# Patient Record
Sex: MALE | Race: WHITE | NOT HISPANIC OR LATINO | Employment: FULL TIME | URBAN - METROPOLITAN AREA
[De-identification: names, ages, dates, MRNs, and addresses within clinical notes are randomized per-mention and may not be internally consistent; named-entity substitution may affect disease eponyms.]

---

## 2017-02-08 ENCOUNTER — HOSPITAL ENCOUNTER (OUTPATIENT)
Dept: GENERAL RADIOLOGY | Facility: HOSPITAL | Age: 62
Discharge: HOME | End: 2017-02-08
Admitting: PHYSICIAN ASSISTANT

## 2017-02-08 ENCOUNTER — TRANSCRIBE ORDERS (OUTPATIENT)
Dept: ADMINISTRATIVE | Facility: HOSPITAL | Age: 62
End: 2017-02-08

## 2017-02-08 DIAGNOSIS — M25.562 LEFT KNEE PAIN, UNSPECIFIED CHRONICITY: Primary | ICD-10-CM

## 2017-02-08 DIAGNOSIS — M25.562 LEFT KNEE PAIN, UNSPECIFIED CHRONICITY: ICD-10-CM

## 2017-02-08 PROCEDURE — 73560 X-RAY EXAM OF KNEE 1 OR 2: CPT

## 2018-01-19 ENCOUNTER — HOSPITAL ENCOUNTER (OUTPATIENT)
Facility: HOSPITAL | Age: 63
Setting detail: HOSPITAL OUTPATIENT SURGERY
End: 2018-01-19
Attending: SURGERY | Admitting: SURGERY

## 2018-03-20 ENCOUNTER — ANESTHESIA EVENT (OUTPATIENT)
Dept: GASTROENTEROLOGY | Facility: HOSPITAL | Age: 63
End: 2018-03-20

## 2018-03-20 ENCOUNTER — HOSPITAL ENCOUNTER (OUTPATIENT)
Facility: HOSPITAL | Age: 63
Setting detail: HOSPITAL OUTPATIENT SURGERY
Discharge: HOME | End: 2018-03-20
Attending: SURGERY | Admitting: SURGERY

## 2018-03-20 ENCOUNTER — ANESTHESIA (OUTPATIENT)
Dept: GASTROENTEROLOGY | Facility: HOSPITAL | Age: 63
End: 2018-03-20

## 2018-03-20 VITALS
BODY MASS INDEX: 39.34 KG/M2 | OXYGEN SATURATION: 98 % | DIASTOLIC BLOOD PRESSURE: 72 MMHG | HEIGHT: 71 IN | HEART RATE: 67 BPM | SYSTOLIC BLOOD PRESSURE: 134 MMHG | RESPIRATION RATE: 18 BRPM | TEMPERATURE: 98.1 F | WEIGHT: 281 LBS

## 2018-03-20 LAB — GLUCOSE BLDC GLUCOMTR-MCNC: 96 MG/DL (ref 70–130)

## 2018-03-20 PROCEDURE — 82962 GLUCOSE BLOOD TEST: CPT

## 2018-03-20 PROCEDURE — 25010000002 PROPOFOL 10 MG/ML EMULSION: Performed by: ANESTHESIOLOGY

## 2018-03-20 RX ORDER — FUROSEMIDE 40 MG/1
40 TABLET ORAL DAILY
COMMUNITY
End: 2020-04-22 | Stop reason: ALTCHOICE

## 2018-03-20 RX ORDER — LIDOCAINE HYDROCHLORIDE 10 MG/ML
0.5 INJECTION, SOLUTION INFILTRATION; PERINEURAL ONCE AS NEEDED
Status: DISCONTINUED | OUTPATIENT
Start: 2018-03-20 | End: 2018-03-20 | Stop reason: HOSPADM

## 2018-03-20 RX ORDER — PROPOFOL 10 MG/ML
VIAL (ML) INTRAVENOUS AS NEEDED
Status: DISCONTINUED | OUTPATIENT
Start: 2018-03-20 | End: 2018-03-20 | Stop reason: SURG

## 2018-03-20 RX ORDER — LIDOCAINE HYDROCHLORIDE 20 MG/ML
INJECTION, SOLUTION INFILTRATION; PERINEURAL AS NEEDED
Status: DISCONTINUED | OUTPATIENT
Start: 2018-03-20 | End: 2018-03-20 | Stop reason: SURG

## 2018-03-20 RX ORDER — SODIUM CHLORIDE, SODIUM LACTATE, POTASSIUM CHLORIDE, CALCIUM CHLORIDE 600; 310; 30; 20 MG/100ML; MG/100ML; MG/100ML; MG/100ML
1000 INJECTION, SOLUTION INTRAVENOUS CONTINUOUS PRN
Status: DISCONTINUED | OUTPATIENT
Start: 2018-03-20 | End: 2018-03-20 | Stop reason: HOSPADM

## 2018-03-20 RX ORDER — SODIUM CHLORIDE 0.9 % (FLUSH) 0.9 %
3 SYRINGE (ML) INJECTION AS NEEDED
Status: DISCONTINUED | OUTPATIENT
Start: 2018-03-20 | End: 2018-03-20 | Stop reason: HOSPADM

## 2018-03-20 RX ORDER — PROPOFOL 10 MG/ML
VIAL (ML) INTRAVENOUS CONTINUOUS PRN
Status: DISCONTINUED | OUTPATIENT
Start: 2018-03-20 | End: 2018-03-20 | Stop reason: SURG

## 2018-03-20 RX ORDER — LISINOPRIL 40 MG/1
40 TABLET ORAL DAILY
COMMUNITY
End: 2019-04-22 | Stop reason: ALTCHOICE

## 2018-03-20 RX ORDER — DILTIAZEM HYDROCHLORIDE 120 MG/1
120 TABLET, FILM COATED ORAL 3 TIMES DAILY
COMMUNITY
End: 2019-04-22

## 2018-03-20 RX ADMIN — SODIUM CHLORIDE, POTASSIUM CHLORIDE, SODIUM LACTATE AND CALCIUM CHLORIDE 1000 ML: 600; 310; 30; 20 INJECTION, SOLUTION INTRAVENOUS at 10:24

## 2018-03-20 RX ADMIN — SODIUM CHLORIDE, POTASSIUM CHLORIDE, SODIUM LACTATE AND CALCIUM CHLORIDE: 600; 310; 30; 20 INJECTION, SOLUTION INTRAVENOUS at 10:37

## 2018-03-20 RX ADMIN — PROPOFOL 150 MG: 10 INJECTION, EMULSION INTRAVENOUS at 10:44

## 2018-03-20 RX ADMIN — PROPOFOL 200 MCG/KG/MIN: 10 INJECTION, EMULSION INTRAVENOUS at 10:44

## 2018-03-20 RX ADMIN — LIDOCAINE HYDROCHLORIDE 30 MG: 20 INJECTION, SOLUTION INFILTRATION; PERINEURAL at 10:44

## 2018-03-20 NOTE — DISCHARGE INSTRUCTIONS
Colonoscopy, Adult, Care After  This sheet gives you information about how to care for yourself after your procedure. Your health care provider may also give you more specific instructions. If you have problems or questions, contact your health care provider.  What can I expect after the procedure?  After the procedure, it is common to have:  · A small amount of blood in your stool for 24 hours after the procedure.  · Some gas.  · Mild abdominal cramping or bloating.  Follow these instructions at home:  General instructions     · For the first 24 hours after the procedure:  ¨ Do not drive or use machinery.  ¨ Do not sign important documents.  ¨ Do not drink alcohol.  ¨ Do your regular daily activities at a slower pace than normal.  ¨ Eat soft, easy-to-digest foods.  ¨ Rest often.  · Take over-the-counter or prescription medicines only as told by your health care provider.  · It is up to you to get the results of your procedure. Ask your health care provider, or the department performing the procedure, when your results will be ready.  Relieving cramping and bloating   · Try walking around when you have cramps or feel bloated.  · Apply heat to your abdomen as told by your health care provider. Use a heat source that your health care provider recommends, such as a moist heat pack or a heating pad.  ¨ Place a towel between your skin and the heat source.  ¨ Leave the heat on for 20-30 minutes.  ¨ Remove the heat if your skin turns bright red. This is especially important if you are unable to feel pain, heat, or cold. You may have a greater risk of getting burned.  Eating and drinking   · Drink enough fluid to keep your urine clear or pale yellow.  · Resume your normal diet as instructed by your health care provider. Avoid heavy or fried foods that are hard to digest.  · Avoid drinking alcohol for as long as instructed by your health care provider.  Contact a health care provider if:  · You have blood in your stool 2-3  days after the procedure.  Get help right away if:  · You have more than a small spotting of blood in your stool.  · You pass large blood clots in your stool.  · Your abdomen is swollen.  · You have nausea or vomiting.  · You have a fever.  · You have increasing abdominal pain that is not relieved with medicine.  This information is not intended to replace advice given to you by your health care provider. Make sure you discuss any questions you have with your health care provider.  Document Released: 08/01/2005 Document Revised: 09/11/2017 Document Reviewed: 02/28/2017  Elsevier Interactive Patient Education © 2017 [employer]

## 2018-03-20 NOTE — ANESTHESIA PREPROCEDURE EVALUATION
Anesthesia Evaluation     Patient summary reviewed and Nursing notes reviewed                Airway   Mallampati: III  TM distance: <3 FB  Neck ROM: full  Possible difficult intubation  Dental - normal exam     Pulmonary - normal exam   Cardiovascular     Rhythm: regular  Rate: abnormal    (+) hypertension, PVD,     PE comment: SB/rate 52    Neuro/Psych  GI/Hepatic/Renal/Endo    (+) obesity, morbid obesity,  diabetes mellitus type 2,     Musculoskeletal     Abdominal  - normal exam   Substance History      OB/GYN          Other                      Anesthesia Plan    ASA 3     MAC     intravenous induction   Anesthetic plan and risks discussed with patient.

## 2018-03-20 NOTE — OP NOTE
Colonoscopy Procedure Note    Pre-operative Diagnosis: screening for colon cancer    Post-operative Diagnosis: normal,     Procedure: Colonoscopy --screening    Surgeon:  Marko Davis M.D,  F.A.C.S.    Sedation: MAC    Procedure Details:  Informed consent was obtained for the procedure, including sedation.  Risks of perforation, hemorrhage, were discussed.The patient was monitored continuously with ECG tracing, pulse oximetry, blood pressure monitoring, and direct observations. The patient was placed in the left lateral decubitus position. The patient was given intravenous sedation by anesthesia.     A digital rectal examination was performed.  The variable-stiffness pediatric colonoscope was inserted into the rectum and advanced under direct vision to the cecum, which was identified by the ileocecal valve and appendiceal orifice.  The quality of the colonic preparation was good. A retroflexed view of the right colon was normal.     Careful inspection was made as the colonoscope was withdrawn, including a retroflexed view of the rectum.  No abnormalities were seen anywhere in the colon.  No diverticulosis was seen.  No inflammatory changes or polyps were seen.  Digital exam of the rectum was performed which revealed no abnormalities    Colonoscopy findings:  -normal colonic mucosa throughout  -no polyps    Specimens: none           Complications:  none    Recommendations:  -For colon cancer screening in this patient, a repeat colonoscopy is recommended in   10 years.        Signature: Marko Davis MD

## 2018-03-20 NOTE — ANESTHESIA POSTPROCEDURE EVALUATION
Patient: Alexi Leyva    Procedure Summary     Date:  03/20/18 Room / Location:  [facility] ENDOSCOPY 7 /  [facility]    Anesthesia Start:  1037 Anesthesia Stop:  1109    Procedure:  COLONOSCOPY TO CECUM (N/A ) Diagnosis:      Surgeon:  Marko Davis MD Provider:  Onesimo Poe MD    Anesthesia Type:  MAC ASA Status:  3          Anesthesia Type: MAC  Last vitals  BP   172/60 (03/20/18 1014)   Temp   36.6 °C (97.9 °F) (03/20/18 1014)   Pulse   52 (03/20/18 1014)   Resp   16 (03/20/18 1014)     SpO2   98 % (03/20/18 1014)     Post Anesthesia Care and Evaluation    Patient location during evaluation: PHASE II  Patient participation: complete - patient participated  Level of consciousness: awake and alert  Pain management: adequate  Airway patency: patent  Anesthetic complications: No anesthetic complications  PONV Status: none  Cardiovascular status: acceptable  Respiratory status: acceptable  Hydration status: acceptable

## 2019-04-22 ENCOUNTER — OFFICE VISIT (OUTPATIENT)
Dept: CARDIOLOGY | Facility: CLINIC | Age: 64
End: 2019-04-22

## 2019-04-22 VITALS
DIASTOLIC BLOOD PRESSURE: 69 MMHG | HEART RATE: 47 BPM | SYSTOLIC BLOOD PRESSURE: 194 MMHG | HEIGHT: 71 IN | WEIGHT: 299 LBS | BODY MASS INDEX: 41.86 KG/M2

## 2019-04-22 DIAGNOSIS — I10 ESSENTIAL HYPERTENSION: Primary | ICD-10-CM

## 2019-04-22 PROCEDURE — 93000 ELECTROCARDIOGRAM COMPLETE: CPT | Performed by: INTERNAL MEDICINE

## 2019-04-22 PROCEDURE — 99203 OFFICE O/P NEW LOW 30 MIN: CPT | Performed by: INTERNAL MEDICINE

## 2019-04-22 RX ORDER — METOPROLOL SUCCINATE 100 MG/1
100 TABLET, EXTENDED RELEASE ORAL DAILY
COMMUNITY
End: 2019-04-22 | Stop reason: ALTCHOICE

## 2019-04-22 RX ORDER — HYDRALAZINE HYDROCHLORIDE 25 MG/1
25 TABLET, FILM COATED ORAL 3 TIMES DAILY
COMMUNITY
End: 2020-01-31

## 2019-04-22 RX ORDER — AMLODIPINE BESYLATE 10 MG/1
10 TABLET ORAL DAILY
COMMUNITY

## 2019-04-22 RX ORDER — LABETALOL 200 MG/1
200 TABLET, FILM COATED ORAL 2 TIMES DAILY
Qty: 60 TABLET | Refills: 3 | Status: SHIPPED | OUTPATIENT
Start: 2019-04-22 | End: 2019-09-16 | Stop reason: SDUPTHER

## 2019-04-22 RX ORDER — HYDROCHLOROTHIAZIDE 25 MG/1
25 TABLET ORAL DAILY
COMMUNITY
End: 2020-01-31

## 2019-04-22 RX ORDER — TELMISARTAN 40 MG/1
40 TABLET ORAL DAILY
Qty: 30 TABLET | Refills: 6 | Status: SHIPPED | OUTPATIENT
Start: 2019-04-22 | End: 2020-01-31

## 2019-04-22 RX ORDER — CLONIDINE HYDROCHLORIDE 0.3 MG/1
0.3 TABLET ORAL 2 TIMES DAILY
COMMUNITY
End: 2019-07-01

## 2019-05-01 PROBLEM — I10 ESSENTIAL HYPERTENSION: Status: ACTIVE | Noted: 2019-05-01

## 2019-05-06 ENCOUNTER — HOSPITAL ENCOUNTER (OUTPATIENT)
Dept: CARDIOLOGY | Facility: HOSPITAL | Age: 64
Discharge: HOME | End: 2019-05-06
Admitting: INTERNAL MEDICINE

## 2019-05-06 VITALS
WEIGHT: 299 LBS | HEIGHT: 71 IN | HEART RATE: 53 BPM | DIASTOLIC BLOOD PRESSURE: 77 MMHG | SYSTOLIC BLOOD PRESSURE: 168 MMHG | BODY MASS INDEX: 41.86 KG/M2

## 2019-05-06 LAB
BH CV ECHO MEAS - ACS: 2.5 CM
BH CV ECHO MEAS - AO MAX PG (FULL): 4.9 MMHG
BH CV ECHO MEAS - AO MAX PG: 8.1 MMHG
BH CV ECHO MEAS - AO MEAN PG (FULL): 2 MMHG
BH CV ECHO MEAS - AO MEAN PG: 4 MMHG
BH CV ECHO MEAS - AO ROOT AREA (BSA CORRECTED): 1.4
BH CV ECHO MEAS - AO ROOT AREA: 10.2 CM^2
BH CV ECHO MEAS - AO ROOT DIAM: 3.6 CM
BH CV ECHO MEAS - AO V2 MAX: 142 CM/SEC
BH CV ECHO MEAS - AO V2 MEAN: 93.4 CM/SEC
BH CV ECHO MEAS - AO V2 VTI: 32.5 CM
BH CV ECHO MEAS - ASC AORTA: 4.2 CM
BH CV ECHO MEAS - AVA(I,A): 3.5 CM^2
BH CV ECHO MEAS - AVA(I,D): 3.5 CM^2
BH CV ECHO MEAS - AVA(V,A): 3.3 CM^2
BH CV ECHO MEAS - AVA(V,D): 3.3 CM^2
BH CV ECHO MEAS - BSA(HAYCOCK): 2.7 M^2
BH CV ECHO MEAS - BSA: 2.5 M^2
BH CV ECHO MEAS - BZI_BMI: 41.7 KILOGRAMS/M^2
BH CV ECHO MEAS - BZI_METRIC_HEIGHT: 180.3 CM
BH CV ECHO MEAS - BZI_METRIC_WEIGHT: 135.6 KG
BH CV ECHO MEAS - EDV(CUBED): 148.9 ML
BH CV ECHO MEAS - EDV(MOD-SP2): 138 ML
BH CV ECHO MEAS - EDV(MOD-SP4): 174 ML
BH CV ECHO MEAS - EDV(TEICH): 135.3 ML
BH CV ECHO MEAS - EF(CUBED): 73.6 %
BH CV ECHO MEAS - EF(MOD-BP): 66 %
BH CV ECHO MEAS - EF(MOD-SP2): 66.7 %
BH CV ECHO MEAS - EF(MOD-SP4): 64.4 %
BH CV ECHO MEAS - EF(TEICH): 65 %
BH CV ECHO MEAS - ESV(CUBED): 39.3 ML
BH CV ECHO MEAS - ESV(MOD-SP2): 46 ML
BH CV ECHO MEAS - ESV(MOD-SP4): 62 ML
BH CV ECHO MEAS - ESV(TEICH): 47.4 ML
BH CV ECHO MEAS - FS: 35.8 %
BH CV ECHO MEAS - IVS/LVPW: 0.94
BH CV ECHO MEAS - IVSD: 1.6 CM
BH CV ECHO MEAS - LAT PEAK E' VEL: 9 CM/SEC
BH CV ECHO MEAS - LV DIASTOLIC VOL/BSA (35-75): 69.5 ML/M^2
BH CV ECHO MEAS - LV MASS(C)D: 405.9 GRAMS
BH CV ECHO MEAS - LV MASS(C)DI: 162.2 GRAMS/M^2
BH CV ECHO MEAS - LV MAX PG: 3.2 MMHG
BH CV ECHO MEAS - LV MEAN PG: 2 MMHG
BH CV ECHO MEAS - LV SYSTOLIC VOL/BSA (12-30): 24.8 ML/M^2
BH CV ECHO MEAS - LV V1 MAX: 89.4 CM/SEC
BH CV ECHO MEAS - LV V1 MEAN: 62.2 CM/SEC
BH CV ECHO MEAS - LV V1 VTI: 21.4 CM
BH CV ECHO MEAS - LVIDD: 5.3 CM
BH CV ECHO MEAS - LVIDS: 3.4 CM
BH CV ECHO MEAS - LVLD AP2: 8.6 CM
BH CV ECHO MEAS - LVLD AP4: 8.2 CM
BH CV ECHO MEAS - LVLS AP2: 6.7 CM
BH CV ECHO MEAS - LVLS AP4: 6.3 CM
BH CV ECHO MEAS - LVOT AREA (M): 5.3 CM^2
BH CV ECHO MEAS - LVOT AREA: 5.3 CM^2
BH CV ECHO MEAS - LVOT DIAM: 2.6 CM
BH CV ECHO MEAS - LVPWD: 1.7 CM
BH CV ECHO MEAS - MED PEAK E' VEL: 13.5 CM/SEC
BH CV ECHO MEAS - MV A DUR: 0.2 SEC
BH CV ECHO MEAS - MV A MAX VEL: 89.2 CM/SEC
BH CV ECHO MEAS - MV DEC SLOPE: 319 CM/SEC^2
BH CV ECHO MEAS - MV DEC TIME: 0.16 SEC
BH CV ECHO MEAS - MV E MAX VEL: 106 CM/SEC
BH CV ECHO MEAS - MV E/A: 1.2
BH CV ECHO MEAS - MV MAX PG: 3.2 MMHG
BH CV ECHO MEAS - MV MEAN PG: 1 MMHG
BH CV ECHO MEAS - MV P1/2T MAX VEL: 86.9 CM/SEC
BH CV ECHO MEAS - MV P1/2T: 79.8 MSEC
BH CV ECHO MEAS - MV V2 MAX: 88.9 CM/SEC
BH CV ECHO MEAS - MV V2 MEAN: 50.5 CM/SEC
BH CV ECHO MEAS - MV V2 VTI: 29.2 CM
BH CV ECHO MEAS - MVA P1/2T LCG: 2.5 CM^2
BH CV ECHO MEAS - MVA(P1/2T): 2.8 CM^2
BH CV ECHO MEAS - MVA(VTI): 3.9 CM^2
BH CV ECHO MEAS - PA ACC TIME: 0.11 SEC
BH CV ECHO MEAS - PA MAX PG (FULL): 0.52 MMHG
BH CV ECHO MEAS - PA MAX PG: 3.4 MMHG
BH CV ECHO MEAS - PA PR(ACCEL): 27.7 MMHG
BH CV ECHO MEAS - PA V2 MAX: 92.3 CM/SEC
BH CV ECHO MEAS - PULM A REVS DUR: 0.22 SEC
BH CV ECHO MEAS - PULM A REVS VEL: 30.4 CM/SEC
BH CV ECHO MEAS - PULM DIAS VEL: 45 CM/SEC
BH CV ECHO MEAS - PULM S/D: 1.4
BH CV ECHO MEAS - PULM SYS VEL: 61.7 CM/SEC
BH CV ECHO MEAS - PVA(V,A): 3.2 CM^2
BH CV ECHO MEAS - PVA(V,D): 3.2 CM^2
BH CV ECHO MEAS - QP/QS: 0.66
BH CV ECHO MEAS - RAP SYSTOLE: 3 MMHG
BH CV ECHO MEAS - RV MAX PG: 2.9 MMHG
BH CV ECHO MEAS - RV MEAN PG: 2 MMHG
BH CV ECHO MEAS - RV V1 MAX: 85 CM/SEC
BH CV ECHO MEAS - RV V1 MEAN: 63.6 CM/SEC
BH CV ECHO MEAS - RV V1 VTI: 21.7 CM
BH CV ECHO MEAS - RVOT AREA: 3.5 CM^2
BH CV ECHO MEAS - RVOT DIAM: 2.1 CM
BH CV ECHO MEAS - RVSP: 14.3 MMHG
BH CV ECHO MEAS - SI(AO): 132.2 ML/M^2
BH CV ECHO MEAS - SI(CUBED): 43.8 ML/M^2
BH CV ECHO MEAS - SI(LVOT): 45.4 ML/M^2
BH CV ECHO MEAS - SI(MOD-SP2): 36.8 ML/M^2
BH CV ECHO MEAS - SI(MOD-SP4): 44.8 ML/M^2
BH CV ECHO MEAS - SI(TEICH): 35.1 ML/M^2
BH CV ECHO MEAS - SV(AO): 330.8 ML
BH CV ECHO MEAS - SV(CUBED): 109.6 ML
BH CV ECHO MEAS - SV(LVOT): 113.6 ML
BH CV ECHO MEAS - SV(MOD-SP2): 92 ML
BH CV ECHO MEAS - SV(MOD-SP4): 112 ML
BH CV ECHO MEAS - SV(RVOT): 75.2 ML
BH CV ECHO MEAS - SV(TEICH): 87.9 ML
BH CV ECHO MEAS - TAPSE (>1.6): 2.8 CM2
BH CV ECHO MEAS - TR MAX VEL: 168 CM/SEC
BH CV ECHO MEASUREMENTS AVERAGE E/E' RATIO: 9.42
BH CV XLRA - RV BASE: 3.1 CM
BH CV XLRA - RV LENGTH: 8.1 CM
BH CV XLRA - RV MID: 2.6 CM
BH CV XLRA - TDI S': 14.3 CM/SEC
LEFT ATRIUM VOLUME INDEX: 43 ML/M2
MAXIMAL PREDICTED HEART RATE: 158 BPM
STRESS TARGET HR: 134 BPM

## 2019-05-06 PROCEDURE — 93306 TTE W/DOPPLER COMPLETE: CPT

## 2019-05-06 PROCEDURE — 93306 TTE W/DOPPLER COMPLETE: CPT | Performed by: INTERNAL MEDICINE

## 2019-05-23 ENCOUNTER — OFFICE VISIT (OUTPATIENT)
Dept: CARDIOLOGY | Facility: CLINIC | Age: 64
End: 2019-05-23

## 2019-05-23 VITALS
SYSTOLIC BLOOD PRESSURE: 157 MMHG | HEIGHT: 71 IN | BODY MASS INDEX: 41.86 KG/M2 | HEART RATE: 46 BPM | DIASTOLIC BLOOD PRESSURE: 68 MMHG | WEIGHT: 299 LBS

## 2019-05-23 DIAGNOSIS — I10 ESSENTIAL HYPERTENSION: Primary | ICD-10-CM

## 2019-05-23 DIAGNOSIS — E66.01 MORBID OBESITY WITH BMI OF 40.0-44.9, ADULT (HCC): ICD-10-CM

## 2019-05-23 PROCEDURE — 99213 OFFICE O/P EST LOW 20 MIN: CPT | Performed by: INTERNAL MEDICINE

## 2019-05-23 NOTE — PROGRESS NOTES
" Subjective:        Alexi Leyva is a 64 y.o. male who here for follow up    CC  Follow-up after the change in medication for the blood pressure  HPI  64-year-old male here for the follow-up after the change in medications for the blood pressure, blood pressure has markedly improved with no side effects     Problem List Items Addressed This Visit        Cardiovascular and Mediastinum    Essential hypertension - Primary    Relevant Orders    Stress Test With Myocardial Perfusion One Day       Digestive    Morbid obesity with BMI of 40.0-44.9, adult (CMS/[facility])        .    The following portions of the patient's history were reviewed and updated as appropriate: allergies, current medications, past family history, past medical history, past social history, past surgical history and problem list.    Past Medical History:   Diagnosis Date   • Diabetes mellitus (CMS/[facility])    • Hypertension    • PVD (peripheral vascular disease) (CMS/[facility])      reports that he has never smoked. He has never used smokeless tobacco. He reports that he drinks about 3.0 oz of alcohol per week. He reports that he does not use drugs.   Family History   Problem Relation Age of Onset   • Hypertension Mother    • Hypertension Father    • Heart failure Father    • Heart disease Father    • Heart attack Father    • Hyperlipidemia Neg Hx        Review of Systems  Constitutional: No wt loss, fever, fatigue  Gastrointestinal: No nausea, abdominal pain  Behavioral/Psych: No insomnia or anxiety   Cardiovascular no chest pain  Objective:       Physical Exam  /68   Pulse (!) 46   Ht 180.3 cm (71\")   Wt 136 kg (299 lb)   BMI 41.70 kg/m²   General appearance: No acute changes   Neck: Trachea midline; NECK, supple, no thyromegaly or lymphadenopathy   Lungs: Normal size and shape, normal breath sounds, equal distribution of air, no rales and rhonchi   CV: S1-S2 regular, no murmurs, no rub, no gallop   Abdomen: Soft, non-tender; no masses , no abnormal " abdominal sounds   Extremities: No deformity , normal color , no peripheral edema   Skin: Normal temperature, turgor and texture; no rash, ulcers          Procedures      Echocardiogram:        Current Outpatient Medications:   •  amLODIPine (NORVASC) 10 MG tablet, Take 10 mg by mouth Daily., Disp: , Rfl:   •  CloNIDine (CATAPRES) 0.3 MG tablet, Take 0.3 mg by mouth 2 (Two) Times a Day., Disp: , Rfl:   •  furosemide (LASIX) 80 MG tablet, Take 80 mg by mouth Daily., Disp: , Rfl:   •  hydrALAZINE (APRESOLINE) 25 MG tablet, Take 25 mg by mouth 3 (Three) Times a Day., Disp: , Rfl:   •  hydrochlorothiazide (HYDRODIURIL) 25 MG tablet, Take 25 mg by mouth Daily., Disp: , Rfl:   •  labetalol (NORMODYNE) 200 MG tablet, Take 1 tablet by mouth 2 (Two) Times a Day., Disp: 60 tablet, Rfl: 3  •  POTASSIUM PO, Take 10 mEq by mouth Daily., Disp: , Rfl:   •  telmisartan (MICARDIS) 40 MG tablet, Take 1 tablet by mouth Daily., Disp: 30 tablet, Rfl: 6  •  VITAMIN D, CHOLECALCIFEROL, PO, Take 1 capsule by mouth Daily., Disp: , Rfl:    Assessment:        Patient Active Problem List   Diagnosis   • Essential hypertension     Interpretation Summary     · The left ventricular cavity is mildly dilated.  · Right ventricular cavity is mildly dilated.  · Left atrial cavity size is mild-to-moderately dilated.  · Mild tricuspid valve regurgitation is present.  · Calculated EF = 66%.  · There is no evidence of pericardial effusion.               Plan:            ICD-10-CM ICD-9-CM   1. Essential hypertension I10 401.9   2. Morbid obesity with BMI of 40.0-44.9, adult (CMS/[facility]) E66.01 278.01    Z68.41 V85.41     1. Essential hypertension  Will wean off clonidine    Considering the patient's symptoms as well as clinical situation and  EKG findings, along with cardiac risk factors, ischemic workup is necessary to rule out ischemic cardiomyopathy, stress induced arrhythmias, and functional capacity for diagnosis as well as prognostic consideration    -  Stress Test With Myocardial Perfusion One Day    2. Morbid obesity with BMI of 40.0-44.9, adult (CMS/[facility])  Significant risk of obesity to CAD, HTN has been explained  Advantages of wt reduction has been explained      Wean off clonidine    If blood pressure goes up increase labetolol to three times a day      Stress cardiolyte  See in 6 wks  COUNSELING:    Alexi Ramírez was given to patient for the following topics: diagnostic results, risk factor reductions, impressions, risks and benefits of treatment options and importance of treatment compliance .       SMOKING COUNSELING:    Counseling given: Not Answered      Dictated using Dragon dictation

## 2019-05-24 PROBLEM — E66.01 MORBID OBESITY WITH BMI OF 40.0-44.9, ADULT (HCC): Status: ACTIVE | Noted: 2019-05-24

## 2019-07-01 ENCOUNTER — HOSPITAL ENCOUNTER (OUTPATIENT)
Dept: CARDIOLOGY | Facility: HOSPITAL | Age: 64
Discharge: HOME | End: 2019-07-01

## 2019-07-01 VITALS
HEART RATE: 49 BPM | DIASTOLIC BLOOD PRESSURE: 71 MMHG | BODY MASS INDEX: 41.86 KG/M2 | OXYGEN SATURATION: 96 % | RESPIRATION RATE: 18 BRPM | WEIGHT: 299 LBS | HEIGHT: 71 IN | SYSTOLIC BLOOD PRESSURE: 161 MMHG

## 2019-07-01 PROCEDURE — 78452 HT MUSCLE IMAGE SPECT MULT: CPT | Performed by: INTERNAL MEDICINE

## 2019-07-01 PROCEDURE — 93016 CV STRESS TEST SUPVJ ONLY: CPT | Performed by: INTERNAL MEDICINE

## 2019-07-01 PROCEDURE — A9500 TC99M SESTAMIBI: HCPCS | Performed by: INTERNAL MEDICINE

## 2019-07-01 PROCEDURE — 93018 CV STRESS TEST I&R ONLY: CPT | Performed by: INTERNAL MEDICINE

## 2019-07-01 PROCEDURE — 25010000002 REGADENOSON 0.4 MG/5ML SOLUTION: Performed by: INTERNAL MEDICINE

## 2019-07-01 PROCEDURE — 93017 CV STRESS TEST TRACING ONLY: CPT

## 2019-07-01 PROCEDURE — 0 TECHNETIUM SESTAMIBI: Performed by: INTERNAL MEDICINE

## 2019-07-01 PROCEDURE — 78452 HT MUSCLE IMAGE SPECT MULT: CPT

## 2019-07-01 RX ADMIN — TECHNETIUM TC 99M SESTAMIBI 1 DOSE: 1 INJECTION INTRAVENOUS at 07:23

## 2019-07-01 RX ADMIN — REGADENOSON 0.4 MG: 0.08 INJECTION, SOLUTION INTRAVENOUS at 09:37

## 2019-07-01 RX ADMIN — TECHNETIUM TC 99M SESTAMIBI 1 DOSE: 1 INJECTION INTRAVENOUS at 09:36

## 2019-07-05 LAB
BH CV STRESS BP STAGE 1: NORMAL
BH CV STRESS COMMENTS STAGE 1: NORMAL
BH CV STRESS DOSE REGADENOSON STAGE 1: 0.4
BH CV STRESS DURATION MIN STAGE 1: 2
BH CV STRESS DURATION SEC STAGE 1: 31
BH CV STRESS GRADE STAGE 1: 0
BH CV STRESS HR STAGE 1: 63
BH CV STRESS METS STAGE 1: 1.4
BH CV STRESS PROTOCOL 1: NORMAL
BH CV STRESS RECOVERY BP: NORMAL MMHG
BH CV STRESS RECOVERY HR: 59 BPM
BH CV STRESS RECOVERY O2: 96 %
BH CV STRESS SPEED STAGE 1: 1
BH CV STRESS STAGE 1: 1
LV EF NUC BP: 49 %
MAXIMAL PREDICTED HEART RATE: 158 BPM
PERCENT MAX PREDICTED HR: 33.54 %
STRESS BASELINE BP: NORMAL MMHG
STRESS BASELINE HR: 48 BPM
STRESS O2 SAT REST: 96 %
STRESS PERCENT HR: 39 %
STRESS POST ESTIMATED WORKLOAD: 1.4 METS
STRESS POST EXERCISE DUR MIN: 2 MIN
STRESS POST EXERCISE DUR SEC: 31 SEC
STRESS POST PEAK BP: NORMAL MMHG
STRESS POST PEAK HR: 53 BPM
STRESS TARGET HR: 134 BPM

## 2019-07-11 ENCOUNTER — OFFICE VISIT (OUTPATIENT)
Dept: CARDIOLOGY | Facility: CLINIC | Age: 64
End: 2019-07-11

## 2019-07-11 VITALS
SYSTOLIC BLOOD PRESSURE: 156 MMHG | HEIGHT: 71 IN | WEIGHT: 311 LBS | BODY MASS INDEX: 43.54 KG/M2 | DIASTOLIC BLOOD PRESSURE: 70 MMHG | HEART RATE: 47 BPM

## 2019-07-11 DIAGNOSIS — E66.01 MORBID OBESITY WITH BMI OF 40.0-44.9, ADULT (HCC): ICD-10-CM

## 2019-07-11 DIAGNOSIS — I10 ESSENTIAL HYPERTENSION: Primary | ICD-10-CM

## 2019-07-11 PROCEDURE — 99213 OFFICE O/P EST LOW 20 MIN: CPT | Performed by: INTERNAL MEDICINE

## 2019-07-11 NOTE — PROGRESS NOTES
Subjective:        Alexi Leyva is a 64 y.o. male who here for follow up    CC  The follow-up for the hypertension and obesity  HPI  64-year-old male with known history of the benign essential arterial hypertension as well as obesity here for the follow-up with no complaints of chest pains or tightness in her chest no heaviness of the pressure sensation     Problem List Items Addressed This Visit        Cardiovascular and Mediastinum    Essential hypertension - Primary       Digestive    Morbid obesity with BMI of 40.0-44.9, adult (CMS/[facility])        .Interpretation Summary        · Findings consistent with a normal ECG stress test.  · Findings consistent with a normal ECG stress test.  · Left ventricular ejection fraction is borderline normal (Calculated EF = 49%).  · Myocardial perfusion imaging indicates a normal myocardial perfusion study with no evidence of ischemia.  · Impressions are consistent with a low risk study.  · Early Cardiomyopathy  · Small Lateral defect has questionable sig     Interpretation Summary     · The left ventricular cavity is mildly dilated.  · Right ventricular cavity is mildly dilated.  · Left atrial cavity size is mild-to-moderately dilated.  · Mild tricuspid valve regurgitation is present.  · Calculated EF = 66%.  · There is no evidence of pericardial effusion.            The following portions of the patient's history were reviewed and updated as appropriate: allergies, current medications, past family history, past medical history, past social history, past surgical history and problem list.    Past Medical History:   Diagnosis Date   • Diabetes mellitus (CMS/[facility])     with weight loss not on any medications   • Hypertension    • PVD (peripheral vascular disease) (CMS/[facility])      reports that he has never smoked. He has never used smokeless tobacco. He reports that he drinks about 3.0 oz of alcohol per week. He reports that he does not use drugs.   Family History   Problem Relation Age  "of Onset   • Hypertension Mother    • Hypertension Father    • Heart failure Father    • Heart disease Father    • Heart attack Father    • Hyperlipidemia Neg Hx        Review of Systems  Constitutional: No wt loss, fever, fatigue  Gastrointestinal: No nausea, abdominal pain  Behavioral/Psych: No insomnia or anxiety   Cardiovascular no chest pains or tightness no chest  Objective:       Physical Exam           Physical Exam  /70   Pulse (!) 47   Ht 180.3 cm (71\")   Wt (!) 141 kg (311 lb)   BMI 43.38 kg/m²     General appearance: No acute changes   Eyes: Sclera conjunctiva normal, pupils reactive   HENT: Atraumatic; oropharynx clear with moist mucous membranes and no mucosal ulcerations;  Neck: Trachea midline; NECK, supple, no thyromegaly or lymphadenopathy   Lungs: Normal size and shape, normal breath sounds, equal distribution of air, no rales and rhonchi   CV: S1-S2 regular, no murmurs, no rub, no gallop   Abdomen: Soft, non-tender; no masses , no abnormal abdominal sounds   Extremities: No deformity , normal color , no peripheral edema   Skin: Normal temperature, turgor and texture; no rash, ulcers  Psych: Appropriate affect, alert and oriented to person, place and time           Procedures      Echocardiogram:        Current Outpatient Medications:   •  amLODIPine (NORVASC) 10 MG tablet, Take 10 mg by mouth Daily., Disp: , Rfl:   •  furosemide (LASIX) 80 MG tablet, Take 80 mg by mouth Daily., Disp: , Rfl:   •  hydrALAZINE (APRESOLINE) 25 MG tablet, Take 25 mg by mouth 3 (Three) Times a Day., Disp: , Rfl:   •  hydrochlorothiazide (HYDRODIURIL) 25 MG tablet, Take 25 mg by mouth Daily., Disp: , Rfl:   •  labetalol (NORMODYNE) 200 MG tablet, Take 1 tablet by mouth 2 (Two) Times a Day., Disp: 60 tablet, Rfl: 3  •  POTASSIUM PO, Take 10 mEq by mouth Daily., Disp: , Rfl:   •  telmisartan (MICARDIS) 40 MG tablet, Take 1 tablet by mouth Daily., Disp: 30 tablet, Rfl: 6  •  VITAMIN D, CHOLECALCIFEROL, PO, Take 1 " capsule by mouth Daily., Disp: , Rfl:    Assessment:        Patient Active Problem List   Diagnosis   • Essential hypertension   • Morbid obesity with BMI of 40.0-44.9, adult (CMS/[facility])               Plan:            ICD-10-CM ICD-9-CM   1. Essential hypertension I10 401.9   2. Morbid obesity with BMI of 40.0-44.9, adult (CMS/[facility]) E66.01 278.01    Z68.41 V85.41     1. Essential hypertension  Blood pressure controlled    2. Morbid obesity with BMI of 40.0-44.9, adult (CMS/[facility])  Significant risk of obesity to CAD, HTN has been explained  Advantages of wt reduction has been explained         Specificity and sensitivity of the stress test/ cardiac workup has been explained. Pt has been explained if  Symptoms continue please go to ER, and further w/p will be required.    Also explained this does not rule out coronary artery disease or the future events, continue to emphasize on risk reductions for coronary artery disease    Pt also advised to contact PCP for other causes of symptoms    See in 6 months  COUNSELING:    Alexi Ramírez was given to patient for the following topics: diagnostic results, risk factor reductions, impressions, risks and benefits of treatment options and importance of treatment compliance .       SMOKING COUNSELING:    Counseling given: Not Answered      Dictated using Dragon dictation

## 2019-09-16 RX ORDER — LABETALOL 200 MG/1
TABLET, FILM COATED ORAL
Qty: 60 TABLET | Refills: 2 | Status: SHIPPED | OUTPATIENT
Start: 2019-09-16 | End: 2020-03-02

## 2020-01-29 NOTE — PROGRESS NOTES
Subjective:        Alexi Leyva is a 64 y.o. male who here for follow up    No chief complaint on file.    He is here today for a 6-month follow-up for hypertension  HPI      Alexi Leyva is a 64-year-old male, who is known to this provider.  He has a history of hypertension, PVD, and diabetes.  Stress test 7/2019 was a normal myocardial perfusion study with no evidence of ischemia.  Small lateral defect has questionable significance, and early cardiomyopathy.  Echo on 5/6/2019 showed EF 66%, LV C is mildly dilated, RBC is mildly dilated, LAC size is mild to moderately dilated, mild TV regurgitation is present, no evidence of pericardial effusion.  Last lipid profile noted 10/23/2014 showed , HDL 38, LDL 91, and triglycerides 99.    He has been on chemotherapy, recently. He states he has been on steroids and he has been anxious.    He denies chest pain, shortness of breath, syncope and near syncope.      The following portions of the patient's history were reviewed and updated as appropriate: allergies, current medications, past family history, past medical history, past social history, past surgical history and problem list.    Past Medical History:   Diagnosis Date   • Diabetes mellitus (CMS/[facility])     with weight loss not on any medications   • Hypertension    • PVD (peripheral vascular disease) (CMS/[facility])          reports that he has never smoked. He has never used smokeless tobacco. He reports that he drinks about 5.0 standard drinks of alcohol per week. He reports that he does not use drugs.     Family History   Problem Relation Age of Onset   • Hypertension Mother    • Hypertension Father    • Heart failure Father    • Heart disease Father    • Heart attack Father    • Hyperlipidemia Neg Hx        ROS     Review of Systems  Constitutional: No Weight loss, fever, fatigue .+anxious  gastrointestinal: No nausea, or abdominal pain   behavioral/Psych: No insomnia or anxiety  Cardiovascular: Denies chest pain,  shortness of breath, syncope and near syncope      Objective:           Physical Exam   Constitutional: He is oriented to person, place, and time. He appears well-developed and well-nourished.   HENT:   Head: Normocephalic.   Right Ear: External ear normal.   Left Ear: External ear normal.   Eyes: EOM are normal.   Neck: Normal range of motion. No JVD present.   Cardiovascular: Normal rate, regular rhythm, normal heart sounds and intact distal pulses. Exam reveals no gallop and no friction rub.   No murmur heard.  Pulmonary/Chest: Effort normal and breath sounds normal. No stridor. No respiratory distress. He has no rales.   Abdominal: Soft. Bowel sounds are normal. He exhibits no distension. There is no tenderness. There is no guarding.   Musculoskeletal: Normal range of motion. He exhibits no edema or tenderness.   Neurological: He is alert and oriented to person, place, and time. He has normal reflexes.   Skin: Skin is warm.   Psychiatric: He has a normal mood and affect. Judgment normal.   Nursing note and vitals reviewed.    4/22/19  Procedures       Interpretation Summary     · The left ventricular cavity is mildly dilated.  · Right ventricular cavity is mildly dilated.  · Left atrial cavity size is mild-to-moderately dilated.  · Mild tricuspid valve regurgitation is present.  · Calculated EF = 66%.  · There is no evidence of pericardial effusion.          Interpretation Summary        · Findings consistent with a normal ECG stress test.  · Findings consistent with a normal ECG stress test.  · Left ventricular ejection fraction is borderline normal (Calculated EF = 49%).  · Myocardial perfusion imaging indicates a normal myocardial perfusion study with no evidence of ischemia.  · Impressions are consistent with a low risk study.  · Early Cardiomyopathy  · Small Lateral defect has questionable sig           Current Outpatient Medications:   •  amLODIPine (NORVASC) 10 MG tablet, Take 10 mg by mouth Daily., Disp:  , Rfl:   •  furosemide (LASIX) 80 MG tablet, Take 80 mg by mouth Daily., Disp: , Rfl:   •  hydrALAZINE (APRESOLINE) 25 MG tablet, Take 25 mg by mouth 3 (Three) Times a Day., Disp: , Rfl:   •  hydrochlorothiazide (HYDRODIURIL) 25 MG tablet, Take 25 mg by mouth Daily., Disp: , Rfl:   •  labetalol (NORMODYNE) 200 MG tablet, TAKE ONE TABLET BY MOUTH TWICE A DAY, Disp: 60 tablet, Rfl: 2  •  POTASSIUM PO, Take 10 mEq by mouth Daily., Disp: , Rfl:   •  telmisartan (MICARDIS) 40 MG tablet, Take 1 tablet by mouth Daily., Disp: 30 tablet, Rfl: 6  •  VITAMIN D, CHOLECALCIFEROL, PO, Take 1 capsule by mouth Daily., Disp: , Rfl:      Assessment:        Patient Active Problem List   Diagnosis   • Essential hypertension   • Morbid obesity with BMI of 40.0-44.9, adult (CMS/[facility])               Plan:   1.  Hypertension: Today in the office his blood pressure is higher. He states since he has been on steroids and in pain his pressures have been higher. Will add hydralazine 25 mg tid. He will do a blood pressure diary.  Last known lipid profile in 2014 showed good control.  He gets labs drawn at his PCP.       Educated patient on exercising for at least 30 minutes a day for 2 to 3 days a week. Importance of controlling hypertension and blood pressure checkup on the regular basis has been explained. Hypertension as a silent killer has been discussed. Risk reduction of the weight and regular exercises to control the hypertension has been explained.    2.  Obesity: BMI 42.5    Significant risk of obesity to CAD, HTN has been explained  Advantages of wt reduction has been explained.         No diagnosis found.    There are no diagnoses linked to this encounter.    COUNSELING:    Alexi Ramírez was given to patient for the following topics: diagnostic results, risk factor reductions, impressions, risks and benefits of treatment options and importance of treatment compliance .       SMOKING COUNSELING:  I will restart hydralazine 25  mg tid and refill his labetalol. He will follow-up with Dr. Meng in 1 months unless he needs to be seen sooner.    Sincerely,   EFRAIN Kennedy  [facility]  1/31/20  1025    EMR Dragon/Transcription disclaimer:   Much of this encounter note is an electronic transcription/translation of spoken language to printed text. The electronic translation of spoken language may permit erroneous, or at times, nonsensical words or phrases to be inadvertently transcribed; Although I have reviewed the note for such errors, some may still exist.

## 2020-01-31 ENCOUNTER — OFFICE VISIT (OUTPATIENT)
Dept: CARDIOLOGY | Facility: CLINIC | Age: 65
End: 2020-01-31

## 2020-01-31 VITALS
SYSTOLIC BLOOD PRESSURE: 176 MMHG | WEIGHT: 305 LBS | HEIGHT: 71 IN | HEART RATE: 76 BPM | DIASTOLIC BLOOD PRESSURE: 78 MMHG | BODY MASS INDEX: 42.7 KG/M2

## 2020-01-31 DIAGNOSIS — I10 ESSENTIAL HYPERTENSION: Primary | ICD-10-CM

## 2020-01-31 DIAGNOSIS — E66.01 MORBID OBESITY WITH BMI OF 40.0-44.9, ADULT (HCC): ICD-10-CM

## 2020-01-31 PROCEDURE — 99213 OFFICE O/P EST LOW 20 MIN: CPT | Performed by: NURSE PRACTITIONER

## 2020-01-31 RX ORDER — DEXAMETHASONE 4 MG/1
TABLET ORAL
COMMUNITY
Start: 2019-12-18 | End: 2020-04-22

## 2020-01-31 RX ORDER — PREDNISONE 10 MG/1
TABLET ORAL DAILY
COMMUNITY
End: 2020-03-02

## 2020-01-31 RX ORDER — OXYCODONE AND ACETAMINOPHEN 10; 325 MG/1; MG/1
1 TABLET ORAL
COMMUNITY
Start: 2019-11-22 | End: 2020-03-02

## 2020-01-31 RX ORDER — ONDANSETRON HYDROCHLORIDE 8 MG/1
8 TABLET, FILM COATED ORAL
COMMUNITY
Start: 2019-12-18 | End: 2020-03-02

## 2020-01-31 RX ORDER — GABAPENTIN 300 MG/1
300 CAPSULE ORAL 3 TIMES DAILY
COMMUNITY
Start: 2020-01-02 | End: 2020-04-22

## 2020-01-31 RX ORDER — CYCLOPHOSPHAMIDE 50 MG/1
600 CAPSULE ORAL
COMMUNITY
Start: 2019-12-24 | End: 2020-04-22

## 2020-01-31 RX ORDER — AMLODIPINE BESYLATE AND BENAZEPRIL HYDROCHLORIDE 10; 20 MG/1; MG/1
1 CAPSULE ORAL DAILY
COMMUNITY
End: 2020-01-31

## 2020-01-31 RX ORDER — HYDRALAZINE HYDROCHLORIDE 25 MG/1
25 TABLET, FILM COATED ORAL 3 TIMES DAILY
Qty: 90 TABLET | Refills: 5 | Status: SHIPPED | OUTPATIENT
Start: 2020-01-31 | End: 2020-04-22 | Stop reason: DRUGHIGH

## 2020-01-31 RX ORDER — TELMISARTAN 80 MG/1
80 TABLET ORAL DAILY
COMMUNITY
End: 2020-03-02 | Stop reason: ALTCHOICE

## 2020-01-31 RX ORDER — POTASSIUM CHLORIDE 750 MG/1
10 TABLET, FILM COATED, EXTENDED RELEASE ORAL DAILY
COMMUNITY
Start: 2020-01-20

## 2020-03-02 ENCOUNTER — OFFICE VISIT (OUTPATIENT)
Dept: CARDIOLOGY | Facility: CLINIC | Age: 65
End: 2020-03-02

## 2020-03-02 VITALS
WEIGHT: 296 LBS | BODY MASS INDEX: 41.44 KG/M2 | DIASTOLIC BLOOD PRESSURE: 85 MMHG | HEART RATE: 60 BPM | HEIGHT: 71 IN | SYSTOLIC BLOOD PRESSURE: 202 MMHG

## 2020-03-02 DIAGNOSIS — E66.01 MORBID OBESITY WITH BMI OF 40.0-44.9, ADULT (HCC): ICD-10-CM

## 2020-03-02 DIAGNOSIS — I10 ESSENTIAL HYPERTENSION: Primary | ICD-10-CM

## 2020-03-02 PROCEDURE — 99213 OFFICE O/P EST LOW 20 MIN: CPT | Performed by: INTERNAL MEDICINE

## 2020-03-02 RX ORDER — ACYCLOVIR 400 MG/1
400 TABLET ORAL
COMMUNITY
End: 2020-05-20

## 2020-03-02 RX ORDER — LABETALOL 200 MG/1
200 TABLET, FILM COATED ORAL 2 TIMES DAILY
Qty: 60 TABLET | Refills: 1 | Status: SHIPPED | OUTPATIENT
Start: 2020-03-02 | End: 2020-03-16

## 2020-03-02 RX ORDER — LABETALOL 200 MG/1
TABLET, FILM COATED ORAL
Qty: 60 TABLET | Refills: 1 | Status: SHIPPED | OUTPATIENT
Start: 2020-03-02 | End: 2020-03-02

## 2020-03-02 RX ORDER — TELMISARTAN AND HYDROCHLORTHIAZIDE 80; 25 MG/1; MG/1
1 TABLET ORAL DAILY
Qty: 30 TABLET | Refills: 11 | Status: SHIPPED | OUTPATIENT
Start: 2020-03-02 | End: 2021-03-02

## 2020-03-02 RX ORDER — CLONIDINE HYDROCHLORIDE 0.1 MG/1
TABLET ORAL
COMMUNITY
Start: 2020-02-20

## 2020-03-02 NOTE — PROGRESS NOTES
Subjective:        Alexi Leyva is a 64 y.o. male who here for follow up    CC  bp running high    Under chemo    Recent hosp   HPI  64-year-old male moderately obese here for the follow-up complaining that the blood pressure has been running high, also complains of shortness of breath, patient was recently admitted to the hospital with influenza     Problem List Items Addressed This Visit        Cardiovascular and Mediastinum    Essential hypertension - Primary    Relevant Medications    cloNIDine (CATAPRES) 0.1 MG tablet    telmisartan-hydrochlorothiazide (MICARDIS HCT) 80-25 MG per tablet    labetalol (NORMODYNE) 200 MG tablet    Other Relevant Orders    Basic Metabolic Panel       Digestive    Morbid obesity with BMI of 40.0-44.9, adult (CMS/[facility])        .Summary   The estimated ejection fraction is 60-65% with no clear regional wall motion   abnormalities.   There is grade I left ventricular diastolic dysfunction (impaired   relaxation).   Normal RV structure and systolic function is identified.   No significant valvular abnormalities are visualized.   There is no evidence of a pericardial effusion.     Any valve disease noted in the report is non-rheumatic unless otherwise   specifically noted.    The following portions of the patient's history were reviewed and updated as appropriate: allergies, current medications, past family history, past medical history, past social history, past surgical history and problem list.    Past Medical History:   Diagnosis Date   • Cancer (CMS/[facility])    • Diabetes mellitus (CMS/[facility])     with weight loss not on any medications   • Hypertension    • PVD (peripheral vascular disease) (CMS/[facility])      reports that he has never smoked. He has never used smokeless tobacco. He reports that he drinks about 5.0 standard drinks of alcohol per week. He reports that he does not use drugs.   Family History   Problem Relation Age of Onset   • Hypertension Mother    • Hypertension Father    •  "Heart failure Father    • Heart disease Father    • Heart attack Father    • Hyperlipidemia Neg Hx        Review of Systems  Constitutional: No wt loss, fever, fatigue  Gastrointestinal: No nausea, abdominal pain  Behavioral/Psych: No insomnia or anxiety   Cardiovascular shortness of breath  Objective:       Physical Exam  BP (!) 202/85 (BP Location: Left arm, Patient Position: Sitting)   Pulse 60   Ht 180.3 cm (71\")   Wt 134 kg (296 lb)   BMI 41.28 kg/m²   General appearance: No acute changes   Neck: Trachea midline; NECK, supple, no thyromegaly or lymphadenopathy   Lungs: Normal size and shape, normal breath sounds, equal distribution of air, no rales and rhonchi   CV: S1-S2 regular, no murmurs, no rub, no gallop   Abdomen: Soft, non-tender; no masses , no abnormal abdominal sounds   Extremities: No deformity , normal color , no peripheral edema   Skin: Normal temperature, turgor and texture; no rash, ulcers          Procedures      Echocardiogram:        Current Outpatient Medications:   •  acyclovir (ZOVIRAX) 400 MG tablet, Take 400 mg by mouth Every 4 (Four) Hours While Awake. Take no more than 5 doses a day., Disp: , Rfl:   •  amLODIPine (NORVASC) 10 MG tablet, Take 10 mg by mouth Daily., Disp: , Rfl:   •  calcium carbonate-vitamin d 600-400 MG-UNIT per tablet, Take 1 tablet by mouth., Disp: , Rfl:   •  cloNIDine (CATAPRES) 0.1 MG tablet, clonidine HCl 0.1 mg tablet, Disp: , Rfl:   •  cyclophosphamide 50 MG capsule, Take 600 mg by mouth., Disp: , Rfl:   •  dexamethasone (DECADRON) 4 MG tablet, Take 10 tablets (40 mg) once daily with food on days 1, 8, 15, and 22 of each 28-day treatment cycle.., Disp: , Rfl:   •  furosemide (LASIX) 40 MG tablet, Take 40 mg by mouth Daily., Disp: , Rfl:   •  gabapentin (NEURONTIN) 300 MG capsule, Take 300 mg by mouth 3 (Three) Times a Day., Disp: , Rfl:   •  hydrALAZINE (APRESOLINE) 25 MG tablet, Take 1 tablet by mouth 3 (Three) Times a Day., Disp: 90 tablet, Rfl: 5  •  " labetalol (NORMODYNE) 200 MG tablet, TAKE ONE TABLET BY MOUTH TWICE A DAY (Patient taking differently: Take 200 mg by mouth Daily.), Disp: 60 tablet, Rfl: 1  •  potassium chloride (K-DUR) 10 MEQ CR tablet, Take 10 mEq by mouth Daily., Disp: , Rfl:   •  telmisartan (MICARDIS) 80 MG tablet, Take 80 mg by mouth Daily., Disp: , Rfl:   •  VITAMIN D, CHOLECALCIFEROL, PO, Take 1 capsule by mouth Daily., Disp: , Rfl:    Assessment:        Patient Active Problem List   Diagnosis   • Essential hypertension   • Morbid obesity with BMI of 40.0-44.9, adult (CMS/[facility])               Plan:            ICD-10-CM ICD-9-CM   1. Essential hypertension I10 401.9   2. Morbid obesity with BMI of 40.0-44.9, adult (CMS/[facility]) E66.01 278.01    Z68.41 V85.41     1. Essential hypertension  The blood pressures under control  - Basic Metabolic Panel    2. Morbid obesity with BMI of 40.0-44.9, adult (CMS/[facility])  Significant risk of obesity to CAD, HTN has been explained  Advantages of wt reduction has been explained         Increase labetolol to 200 mg po bid    Stop lasix    Change telmasatan to 80/25 mg po daily    See in 2  wks with BMP[  COUNSELING:    Alexi Ramírez was given to patient for the following topics: diagnostic results, risk factor reductions, impressions, risks and benefits of treatment options and importance of treatment compliance .       SMOKING COUNSELING:    [unfilled]    Dictated using Dragon dictation

## 2020-03-13 ENCOUNTER — HOSPITAL ENCOUNTER (OUTPATIENT)
Dept: CARDIOLOGY | Facility: HOSPITAL | Age: 65
Discharge: HOME | End: 2020-03-13
Admitting: INTERNAL MEDICINE

## 2020-03-13 LAB
ANION GAP SERPL CALCULATED.3IONS-SCNC: 15 MMOL/L (ref 5–15)
BUN BLD-MCNC: 22 MG/DL (ref 8–23)
BUN/CREAT SERPL: 23.2 (ref 7–25)
CALCIUM SPEC-SCNC: 8.6 MG/DL (ref 8.6–10.5)
CHLORIDE SERPL-SCNC: 104 MMOL/L (ref 98–107)
CO2 SERPL-SCNC: 22 MMOL/L (ref 22–29)
CREAT BLD-MCNC: 0.95 MG/DL (ref 0.76–1.27)
GFR SERPL CREATININE-BSD FRML MDRD: 80 ML/MIN/1.73
GLUCOSE BLD-MCNC: 200 MG/DL (ref 65–99)
POTASSIUM BLD-SCNC: 4.2 MMOL/L (ref 3.5–5.2)
SODIUM BLD-SCNC: 141 MMOL/L (ref 136–145)

## 2020-03-13 PROCEDURE — 36415 COLL VENOUS BLD VENIPUNCTURE: CPT | Performed by: INTERNAL MEDICINE

## 2020-03-13 PROCEDURE — 80048 BASIC METABOLIC PNL TOTAL CA: CPT | Performed by: INTERNAL MEDICINE

## 2020-03-16 ENCOUNTER — OFFICE VISIT (OUTPATIENT)
Dept: CARDIOLOGY | Facility: CLINIC | Age: 65
End: 2020-03-16

## 2020-03-16 VITALS
HEIGHT: 71 IN | BODY MASS INDEX: 43.4 KG/M2 | DIASTOLIC BLOOD PRESSURE: 74 MMHG | WEIGHT: 310 LBS | SYSTOLIC BLOOD PRESSURE: 190 MMHG | HEART RATE: 57 BPM

## 2020-03-16 DIAGNOSIS — R73.9 HYPERGLYCEMIA: Primary | ICD-10-CM

## 2020-03-16 DIAGNOSIS — E66.01 MORBID OBESITY WITH BMI OF 40.0-44.9, ADULT (HCC): ICD-10-CM

## 2020-03-16 DIAGNOSIS — I10 ESSENTIAL HYPERTENSION: ICD-10-CM

## 2020-03-16 PROCEDURE — 99213 OFFICE O/P EST LOW 20 MIN: CPT | Performed by: INTERNAL MEDICINE

## 2020-03-16 RX ORDER — LABETALOL 200 MG/1
200 TABLET, FILM COATED ORAL 3 TIMES DAILY
Qty: 90 TABLET | Refills: 6 | Status: SHIPPED | OUTPATIENT
Start: 2020-03-16 | End: 2020-10-27

## 2020-03-16 NOTE — PROGRESS NOTES
" Subjective:        Alexi Leyva is a 64 y.o. male who here for follow up    CC  Follow-up after the blood results as well as after starting the blood pressure medicine  HPI  64-year-old male morbidly obese, with a benign essential arterial hypertension and hyper glycemia here for the follow-up after the patient blood pressure medications were adjusted blood pressures are better but still running high     Problem List Items Addressed This Visit        Cardiovascular and Mediastinum    Essential hypertension    Relevant Medications    labetalol (NORMODYNE) 200 MG tablet       Digestive    Morbid obesity with BMI of 40.0-44.9, adult (CMS/[facility])       Other    Hyperglycemia - Primary        .    The following portions of the patient's history were reviewed and updated as appropriate: allergies, current medications, past family history, past medical history, past social history, past surgical history and problem list.    Past Medical History:   Diagnosis Date   • Cancer (CMS/[facility])    • Diabetes mellitus (CMS/[facility])     with weight loss not on any medications   • Hypertension    • PVD (peripheral vascular disease) (CMS/[facility])      reports that he has never smoked. He has never used smokeless tobacco. He reports that he drinks about 5.0 standard drinks of alcohol per week. He reports that he does not use drugs.   Family History   Problem Relation Age of Onset   • Hypertension Mother    • Hypertension Father    • Heart failure Father    • Heart disease Father    • Heart attack Father    • Hyperlipidemia Neg Hx        Review of Systems  Constitutional: No wt loss, fever, fatigue  Gastrointestinal: No nausea, abdominal pain  Behavioral/Psych: No insomnia or anxiety   Cardiovascular no chest pains or tightness in the chest  Objective:       Physical Exam  BP (!) 190/74   Pulse 57   Ht 180.3 cm (71\")   Wt (!) 141 kg (310 lb)   BMI 43.24 kg/m²   General appearance: No acute changes   Neck: Trachea midline; NECK, supple, no " thyromegaly or lymphadenopathy   Lungs: Normal size and shape, normal breath sounds, equal distribution of air, no rales and rhonchi   CV: S1-S2 regular, no murmurs, no rub, no gallop   Abdomen: Soft, non-tender; no masses , no abnormal abdominal sounds   Extremities: No deformity , normal color , no peripheral edema   Skin: Normal temperature, turgor and texture; no rash, ulcers          Procedures      Echocardiogram:        Current Outpatient Medications:   •  acyclovir (ZOVIRAX) 400 MG tablet, Take 400 mg by mouth Every 4 (Four) Hours While Awake. Take no more than 5 doses a day., Disp: , Rfl:   •  amLODIPine (NORVASC) 10 MG tablet, Take 10 mg by mouth Daily., Disp: , Rfl:   •  calcium carbonate-vitamin d 600-400 MG-UNIT per tablet, Take 1 tablet by mouth., Disp: , Rfl:   •  cloNIDine (CATAPRES) 0.1 MG tablet, clonidine HCl 0.1 mg tablet, Disp: , Rfl:   •  cyclophosphamide 50 MG capsule, Take 600 mg by mouth., Disp: , Rfl:   •  dexamethasone (DECADRON) 4 MG tablet, Take 10 tablets (40 mg) once daily with food on days 1, 8, 15, and 22 of each 28-day treatment cycle.., Disp: , Rfl:   •  furosemide (LASIX) 40 MG tablet, Take 40 mg by mouth Daily., Disp: , Rfl:   •  gabapentin (NEURONTIN) 300 MG capsule, Take 300 mg by mouth 3 (Three) Times a Day., Disp: , Rfl:   •  hydrALAZINE (APRESOLINE) 25 MG tablet, Take 1 tablet by mouth 3 (Three) Times a Day., Disp: 90 tablet, Rfl: 5  •  labetalol (NORMODYNE) 200 MG tablet, Take 1 tablet by mouth 2 (Two) Times a Day., Disp: 60 tablet, Rfl: 1  •  potassium chloride (K-DUR) 10 MEQ CR tablet, Take 10 mEq by mouth Daily., Disp: , Rfl:   •  telmisartan-hydrochlorothiazide (MICARDIS HCT) 80-25 MG per tablet, Take 1 tablet by mouth Daily., Disp: 30 tablet, Rfl: 11  •  VITAMIN D, CHOLECALCIFEROL, PO, Take 1 capsule by mouth Daily., Disp: , Rfl:    Assessment:        Patient Active Problem List   Diagnosis   • Essential hypertension   • Morbid obesity with BMI of 40.0-44.9, adult  (CMS/[facility])     Glucose  65 - 99 mg/dL 200High     BUN  8 - 23 mg/dL 22    Creatinine  0.76 - 1.27 mg/dL 0.95    Sodium  136 - 145 mmol/L 141    Potassium  3.5 - 5.2 mmol/L 4.2    Chloride  98 - 107 mmol/L 104    CO2  22.0 - 29.0 mmol/L 22.0    Calcium  8.6 - 10.5 mg/dL 8.6    eGFR Non African Amer  >60 mL/min/1.73 80    BUN/Creatinine Ratio  7.0 - 25.0 23.2      WBC  4.5 - 11.0 10*3/uL 5.43    RBC  4.5 - 5.9 10*6/uL 3.27Low     Hemoglobin  13.5 - 17.5 g/dL 11.2Low     Hematocrit  41.0 - 53.0 % 35.6Low     MCV  80.0 - 100.0 fL 108.9High     MCH  26.0 - 34.0 pg 34.3High     MCHC  31.0 - 37.0 g/dL 31.5    RDW  12.0 - 16.8 % 14.2    Platelets  140 - 440 10*3/uL 103Low     MPV  7.8 - 11.0 fL 11.9High                 Plan:            ICD-10-CM ICD-9-CM   1. Hyperglycemia R73.9 790.29   2. Essential hypertension I10 401.9   3. Morbid obesity with BMI of 40.0-44.9, adult (CMS/[facility]) E66.01 278.01    Z68.41 V85.41     1. Hyperglycemia  Blood sugar is high due to the steroids patient has been referred to the primary care physician for the further work-up    2. Essential hypertension  Blood pressure is high persists better than before    We will increase the labetalol    3. Morbid obesity with BMI of 40.0-44.9, adult (CMS/[facility])  Counseling has been done       Increase labetolol 200 mg three times a day    Pros and cons of this new medication / change medication has been explained to  the patient    Possible side effects has been explained    Associated need of the blood  Work has been explained    Need for the compliance of the medication has been explained      Glucose high due to steroids    See in 1 months  COUNSELING:    Alexi Ramírez was given to patient for the following topics: diagnostic results, risk factor reductions, impressions, risks and benefits of treatment options and importance of treatment compliance .       SMOKING COUNSELING:    [unfilled]    Dictated using Dragon dictation

## 2020-04-22 ENCOUNTER — OFFICE VISIT (OUTPATIENT)
Dept: CARDIOLOGY | Facility: CLINIC | Age: 65
End: 2020-04-22

## 2020-04-22 VITALS
WEIGHT: 312 LBS | HEIGHT: 71 IN | HEART RATE: 60 BPM | BODY MASS INDEX: 43.68 KG/M2 | SYSTOLIC BLOOD PRESSURE: 174 MMHG | DIASTOLIC BLOOD PRESSURE: 70 MMHG

## 2020-04-22 DIAGNOSIS — E66.01 MORBID OBESITY WITH BMI OF 40.0-44.9, ADULT (HCC): ICD-10-CM

## 2020-04-22 DIAGNOSIS — I10 ESSENTIAL HYPERTENSION: Primary | ICD-10-CM

## 2020-04-22 PROCEDURE — 99441 PR PHYS/QHP TELEPHONE EVALUATION 5-10 MIN: CPT | Performed by: NURSE PRACTITIONER

## 2020-04-22 RX ORDER — HYDRALAZINE HYDROCHLORIDE 50 MG/1
50 TABLET, FILM COATED ORAL 3 TIMES DAILY
Qty: 90 TABLET | Refills: 2 | Status: SHIPPED | OUTPATIENT
Start: 2020-04-22 | End: 2020-07-20

## 2020-04-22 RX ORDER — SULFAMETHOXAZOLE AND TRIMETHOPRIM 800; 160 MG/1; MG/1
TABLET ORAL
COMMUNITY
Start: 2020-03-22

## 2020-04-22 NOTE — PROGRESS NOTES
Subjective:        Alexi Leyva is a 64 y.o. male who here for follow up    No chief complaint on file.    He is here today via telephone for a 1 month follow-up after an increase in his medication.    HPI      Alexi Leyva is a 64-year-old male, who is known to this provider.  He has a history of HTN, PVD, DM, chemotherapy and steroid therapy.. He was seen by Dr. Wilcox last month and his labetalol was increased to 200 mg 3 times a day. He is here today to discuss results with increase of his medication.    His previous stress test on 7/2019 indicated a normal myocardial perfusion study with no evidence of ischemia. There was a small lateral defect that was questionable significance, and early cardiomyopathy. His echo on 5/2019 showed EF 66%, LVC is mildly dilated, RVC is mildley dilated, LAC size is mild to moderately dilated, mild TV regurgitation is present, no evidence of pericardial effusion. Last lipid profile noted on 10/2014 showed good control.      He denies chest pain, shortness of breath, syncope and near syncope.           The following portions of the patient's history were reviewed and updated as appropriate: allergies, current medications, past family history, past medical history, past social history, past surgical history and problem list.    Past Medical History:   Diagnosis Date   • Cancer (CMS/[facility])    • Diabetes mellitus (CMS/[facility])     with weight loss not on any medications   • Hypertension    • PVD (peripheral vascular disease) (CMS/[facility])          reports that he has never smoked. He has never used smokeless tobacco. He reports that he drinks about 5.0 standard drinks of alcohol per week. He reports that he does not use drugs.     Family History   Problem Relation Age of Onset   • Hypertension Mother    • Hypertension Father    • Heart failure Father    • Heart disease Father    • Heart attack Father    • Hyperlipidemia Neg Hx        ROS     Review of Systems No change  Constitutional:  No Weight loss, fever, fatigue .anxiety improving a little. He has been off his steroids since last Thursday.   gastrointestinal: No nausea, or abdominal pain   behavioral/Psych: No insomnia or anxiety  Cardiovascular: Denies chest pain, shortness of breath, syncope and near syncope      Objective:        This is a telephone visit, unable to do PE.    Physical Exam     4/22/19  Procedures       Interpretation Summary     · The left ventricular cavity is mildly dilated.  · Right ventricular cavity is mildly dilated.  · Left atrial cavity size is mild-to-moderately dilated.  · Mild tricuspid valve regurgitation is present.  · Calculated EF = 66%.  · There is no evidence of pericardial effusion.          Interpretation Summary        · Findings consistent with a normal ECG stress test.  · Findings consistent with a normal ECG stress test.  · Left ventricular ejection fraction is borderline normal (Calculated EF = 49%).  · Myocardial perfusion imaging indicates a normal myocardial perfusion study with no evidence of ischemia.  · Impressions are consistent with a low risk study.  · Early Cardiomyopathy  · Small Lateral defect has questionable sig           Current Outpatient Medications:   •  acyclovir (ZOVIRAX) 400 MG tablet, Take 400 mg by mouth Every 4 (Four) Hours While Awake. Take no more than 5 doses a day., Disp: , Rfl:   •  amLODIPine (NORVASC) 10 MG tablet, Take 10 mg by mouth Daily., Disp: , Rfl:   •  calcium carbonate-vitamin d 600-400 MG-UNIT per tablet, Take 1 tablet by mouth., Disp: , Rfl:   •  cloNIDine (CATAPRES) 0.1 MG tablet, clonidine HCl 0.1 mg tablet, Disp: , Rfl:   •  cyclophosphamide 50 MG capsule, Take 600 mg by mouth., Disp: , Rfl:   •  dexamethasone (DECADRON) 4 MG tablet, Take 10 tablets (40 mg) once daily with food on days 1, 8, 15, and 22 of each 28-day treatment cycle.., Disp: , Rfl:   •  furosemide (LASIX) 40 MG tablet, Take 40 mg by mouth Daily., Disp: , Rfl:   •  gabapentin (NEURONTIN)  300 MG capsule, Take 300 mg by mouth 3 (Three) Times a Day., Disp: , Rfl:   •  hydrALAZINE (APRESOLINE) 25 MG tablet, Take 1 tablet by mouth 3 (Three) Times a Day., Disp: 90 tablet, Rfl: 5  •  labetalol (NORMODYNE) 200 MG tablet, Take 1 tablet by mouth 3 (Three) Times a Day., Disp: 90 tablet, Rfl: 6  •  potassium chloride (K-DUR) 10 MEQ CR tablet, Take 10 mEq by mouth Daily., Disp: , Rfl:   •  telmisartan-hydrochlorothiazide (MICARDIS HCT) 80-25 MG per tablet, Take 1 tablet by mouth Daily., Disp: 30 tablet, Rfl: 11  •  VITAMIN D, CHOLECALCIFEROL, PO, Take 1 capsule by mouth Daily., Disp: , Rfl:      Assessment:        Patient Active Problem List   Diagnosis   • Essential hypertension   • Morbid obesity with BMI of 40.0-44.9, adult (CMS/[facility])   • Hyperglycemia               Plan:   1. Hypertension: He had a increase with his labetalol to 200 mg three times a day last month and is here today via telephone to see the his results. His blood pressure continues to to be high. I will increase his hydralazine. He will continue to monitor his blood pressure and he will let us know if it becomes to high or to low.      His PCP manages his cholesterol.    Educated patient on exercising for at least 30 minutes a day for 2 to 3 days a week. Importance of controlling hypertension and blood pressure checkup on the regular basis has been explained. Hypertension as a silent killer has been discussed. Risk reduction of the weight and regular exercises to control the hypertension has been explained.          2.  Obesity: BMI: 42.5    Significant risk of obesity to CAD, HTN has been explained  Advantages of wt reduction has been explained     No diagnosis found.    There are no diagnoses linked to this encounter.    COUNSELING:    Alexi Ramírez was given to patient for the following topics: diagnostic results, risk factor reductions, impressions, risks and benefits of treatment options and importance of treatment compliance .        SMOKING COUNSELING: Denies     I will increase his hydralazine. He will follow up in in 1 month for a blood pressure recheck..     Sincerely,   EFRAIN Kennedy  [facility]  4/22/20  1033    This patient has consented to a telehealth visit via telephone. The visit was scheduled as a telephone visit to comply with patient safety concerns in accordance with CDC recommendations.  All vitals recorded within this visit are reported by the patient.  I spent 25 minutes in total including but not limited to the10  minutes spent in direct conversation with this patient.

## 2020-05-13 ENCOUNTER — APPOINTMENT (OUTPATIENT)
Dept: GENERAL RADIOLOGY | Facility: HOSPITAL | Age: 65
End: 2020-05-13

## 2020-05-13 ENCOUNTER — HOSPITAL ENCOUNTER (EMERGENCY)
Facility: HOSPITAL | Age: 65
Discharge: HOME | End: 2020-05-13
Attending: EMERGENCY MEDICINE | Admitting: EMERGENCY MEDICINE

## 2020-05-13 VITALS
HEIGHT: 71 IN | OXYGEN SATURATION: 94 % | DIASTOLIC BLOOD PRESSURE: 108 MMHG | BODY MASS INDEX: 43.54 KG/M2 | HEART RATE: 71 BPM | TEMPERATURE: 97.2 F | RESPIRATION RATE: 18 BRPM | WEIGHT: 311 LBS | SYSTOLIC BLOOD PRESSURE: 206 MMHG

## 2020-05-13 DIAGNOSIS — R07.89 ACUTE CHEST WALL PAIN: Primary | ICD-10-CM

## 2020-05-13 LAB
ALBUMIN SERPL-MCNC: 3.6 G/DL (ref 3.5–5.2)
ALBUMIN/GLOB SERPL: 1.1 G/DL
ALP SERPL-CCNC: 56 U/L (ref 39–117)
ALT SERPL W P-5'-P-CCNC: 13 U/L (ref 1–41)
ANION GAP SERPL CALCULATED.3IONS-SCNC: 12.8 MMOL/L (ref 5–15)
AST SERPL-CCNC: 14 U/L (ref 1–40)
BASOPHILS # BLD MANUAL: 0.08 10*3/MM3 (ref 0–0.2)
BASOPHILS NFR BLD AUTO: 1 % (ref 0–1.5)
BILIRUB SERPL-MCNC: 0.5 MG/DL (ref 0.2–1.2)
BUN BLD-MCNC: 18 MG/DL (ref 8–23)
BUN/CREAT SERPL: 20.9 (ref 7–25)
CALCIUM SPEC-SCNC: 9.2 MG/DL (ref 8.6–10.5)
CHLORIDE SERPL-SCNC: 97 MMOL/L (ref 98–107)
CO2 SERPL-SCNC: 28.2 MMOL/L (ref 22–29)
CREAT BLD-MCNC: 0.86 MG/DL (ref 0.76–1.27)
DEPRECATED RDW RBC AUTO: 60.1 FL (ref 37–54)
EOSINOPHIL # BLD MANUAL: 0.18 10*3/MM3 (ref 0–0.4)
EOSINOPHIL NFR BLD MANUAL: 2.1 % (ref 0.3–6.2)
ERYTHROCYTE [DISTWIDTH] IN BLOOD BY AUTOMATED COUNT: 15.6 % (ref 12.3–15.4)
GFR SERPL CREATININE-BSD FRML MDRD: 90 ML/MIN/1.73
GLOBULIN UR ELPH-MCNC: 3.3 GM/DL
GLUCOSE BLD-MCNC: 160 MG/DL (ref 65–99)
HCT VFR BLD AUTO: 35.7 % (ref 37.5–51)
HGB BLD-MCNC: 12.3 G/DL (ref 13–17.7)
LYMPHOCYTES # BLD MANUAL: 0.87 10*3/MM3 (ref 0.7–3.1)
LYMPHOCYTES NFR BLD MANUAL: 10.3 % (ref 19.6–45.3)
LYMPHOCYTES NFR BLD MANUAL: 5.2 % (ref 5–12)
MCH RBC QN AUTO: 36.3 PG (ref 26.6–33)
MCHC RBC AUTO-ENTMCNC: 34.5 G/DL (ref 31.5–35.7)
MCV RBC AUTO: 105.3 FL (ref 79–97)
MONOCYTES # BLD AUTO: 0.44 10*3/MM3 (ref 0.1–0.9)
NEUTROPHILS # BLD AUTO: 6.86 10*3/MM3 (ref 1.7–7)
NEUTROPHILS NFR BLD MANUAL: 81.4 % (ref 42.7–76)
NT-PROBNP SERPL-MCNC: 232.9 PG/ML (ref 5–900)
PLAT MORPH BLD: NORMAL
PLATELET # BLD AUTO: 105 10*3/MM3 (ref 140–450)
PMV BLD AUTO: 11.2 FL (ref 6–12)
POTASSIUM BLD-SCNC: 3.6 MMOL/L (ref 3.5–5.2)
PROT SERPL-MCNC: 6.9 G/DL (ref 6–8.5)
RBC # BLD AUTO: 3.39 10*6/MM3 (ref 4.14–5.8)
RBC MORPH BLD: NORMAL
SODIUM BLD-SCNC: 138 MMOL/L (ref 136–145)
TROPONIN T SERPL-MCNC: <0.01 NG/ML (ref 0–0.03)
TROPONIN T SERPL-MCNC: <0.01 NG/ML (ref 0–0.03)
WBC MORPH BLD: NORMAL
WBC NRBC COR # BLD: 8.43 10*3/MM3 (ref 3.4–10.8)

## 2020-05-13 PROCEDURE — 99284 EMERGENCY DEPT VISIT MOD MDM: CPT

## 2020-05-13 PROCEDURE — 84484 ASSAY OF TROPONIN QUANT: CPT | Performed by: EMERGENCY MEDICINE

## 2020-05-13 PROCEDURE — 83880 ASSAY OF NATRIURETIC PEPTIDE: CPT | Performed by: EMERGENCY MEDICINE

## 2020-05-13 PROCEDURE — 93005 ELECTROCARDIOGRAM TRACING: CPT | Performed by: EMERGENCY MEDICINE

## 2020-05-13 PROCEDURE — 25010000002 KETOROLAC TROMETHAMINE PER 15 MG: Performed by: EMERGENCY MEDICINE

## 2020-05-13 PROCEDURE — 85025 COMPLETE CBC W/AUTO DIFF WBC: CPT | Performed by: EMERGENCY MEDICINE

## 2020-05-13 PROCEDURE — 96374 THER/PROPH/DIAG INJ IV PUSH: CPT

## 2020-05-13 PROCEDURE — 80053 COMPREHEN METABOLIC PANEL: CPT | Performed by: EMERGENCY MEDICINE

## 2020-05-13 PROCEDURE — 85007 BL SMEAR W/DIFF WBC COUNT: CPT | Performed by: EMERGENCY MEDICINE

## 2020-05-13 PROCEDURE — 93010 ELECTROCARDIOGRAM REPORT: CPT | Performed by: INTERNAL MEDICINE

## 2020-05-13 PROCEDURE — 36415 COLL VENOUS BLD VENIPUNCTURE: CPT

## 2020-05-13 PROCEDURE — 71045 X-RAY EXAM CHEST 1 VIEW: CPT

## 2020-05-13 RX ORDER — SODIUM CHLORIDE 0.9 % (FLUSH) 0.9 %
10 SYRINGE (ML) INJECTION AS NEEDED
Status: DISCONTINUED | OUTPATIENT
Start: 2020-05-13 | End: 2020-05-13 | Stop reason: HOSPADM

## 2020-05-13 RX ORDER — CYCLOPHOSPHAMIDE 50 MG/1
600 CAPSULE ORAL WEEKLY
COMMUNITY
Start: 2020-04-30

## 2020-05-13 RX ORDER — DEXAMETHASONE 4 MG/1
40 TABLET ORAL WEEKLY
COMMUNITY

## 2020-05-13 RX ORDER — KETOROLAC TROMETHAMINE 30 MG/ML
30 INJECTION, SOLUTION INTRAMUSCULAR; INTRAVENOUS ONCE
Status: COMPLETED | OUTPATIENT
Start: 2020-05-13 | End: 2020-05-13

## 2020-05-13 RX ADMIN — KETOROLAC TROMETHAMINE 30 MG: 30 INJECTION, SOLUTION INTRAMUSCULAR at 03:01

## 2020-05-13 NOTE — ED PROVIDER NOTES
EMERGENCY DEPARTMENT ENCOUNTER    CHIEF COMPLAINT  Chief Complaint: chest pain  History given by: pt  History limited by: none  Room Number: 15/15  PMD: Henry Tapia MD      HPI:  Pt is a 65 y.o. male who presents complaining of left-sided chest discomfort that appeared suddenly and was present upon awaking this morning.  The patient states that he felt as though it was more of an indigestion but his nausea med from his chemo did not improve symptoms.  EMS was called and they did administer sublingual nitro prior to coming.  The patient states that this may have helped slightly though not totally sure.  The patient does complain of associated nausea that has since resolved, but denies shortness of breath, sweatiness, headache, or blurry vision.  The patient states that the pain is isolated to the left lateral chest area and it does not radiate.  He states that the pain is mildly worsened to the touch.  The patient has had previous episodes with chest discomfort/heart related issues requiring a stress test as well as an echocardiogram last year.  The patient sees Dr. Wilcox for cardiology and he is currently on chemotherapy secondary to multiple myeloma.    PAST MEDICAL HISTORY  Active Ambulatory Problems     Diagnosis Date Noted   • Essential hypertension 05/01/2019   • Morbid obesity with BMI of 40.0-44.9, adult (CMS/HCC) 05/24/2019   • Hyperglycemia 03/16/2020     Resolved Ambulatory Problems     Diagnosis Date Noted   • No Resolved Ambulatory Problems     Past Medical History:   Diagnosis Date   • Cancer (CMS/HCC)    • Diabetes mellitus (CMS/HCC)    • Hypertension    • PVD (peripheral vascular disease) (CMS/[facility])        PAST SURGICAL HISTORY  Past Surgical History:   Procedure Laterality Date   • BACK SURGERY     • COLONOSCOPY N/A 3/20/2018    Procedure: COLONOSCOPY TO CECUM;  Surgeon: Marko Davis MD;  Location: [facility];  Service: General   • VASCULAR SURGERY Right     lower leg        FAMILY HISTORY  Family History   Problem Relation Age of Onset   • Hypertension Mother    • Hypertension Father    • Heart failure Father    • Heart disease Father    • Heart attack Father    • Hyperlipidemia Neg Hx        SOCIAL HISTORY  Social History     Socioeconomic History   • Marital status:      Spouse name: Not on file   • Number of children: Not on file   • Years of education: Not on file   • Highest education level: Not on file   Tobacco Use   • Smoking status: Never Smoker   • Smokeless tobacco: Never Used   Substance and Sexual Activity   • Alcohol use: Not Currently     Alcohol/week: 5.0 standard drinks     Types: 5 Cans of beer per week   • Drug use: No   • Sexual activity: Defer       ALLERGIES  Patient has no known allergies.    REVIEW OF SYSTEMS  Review of Systems   Constitutional: Negative for activity change, appetite change and fever.   HENT: Negative for congestion and sore throat.    Eyes: Negative.    Respiratory: Negative for cough and shortness of breath.    Cardiovascular: Positive for chest pain. Negative for leg swelling.   Gastrointestinal: Positive for nausea. Negative for abdominal pain, diarrhea and vomiting.   Endocrine: Negative.    Genitourinary: Negative for decreased urine volume and dysuria.   Musculoskeletal: Negative for neck pain.   Skin: Negative for rash and wound.   Allergic/Immunologic: Negative.    Neurological: Negative for weakness, numbness and headaches.   Hematological: Negative.    Psychiatric/Behavioral: Negative.    All other systems reviewed and are negative.      PHYSICAL EXAM  ED Triage Vitals [05/13/20 0223]   Temp Heart Rate Resp BP SpO2   97.2 °F (36.2 °C) 96 20 170/84 96 %      Temp src Heart Rate Source Patient Position BP Location FiO2 (%)   Tympanic Monitor Sitting Right arm --       Physical Exam   Constitutional: He is oriented to person, place, and time. No distress.   HENT:   Head: Normocephalic and atraumatic.   Eyes: Pupils are  equal, round, and reactive to light. EOM are normal.   Neck: Normal range of motion. Neck supple.   Cardiovascular: Normal rate, regular rhythm and normal heart sounds.   Pulmonary/Chest: Effort normal and breath sounds normal. No respiratory distress. He exhibits tenderness.   There is mild tenderness to the left lateral chest wall with reproduction of symptoms   Abdominal: Soft. There is no tenderness. There is no rebound and no guarding.   Musculoskeletal: Normal range of motion. He exhibits no edema.   Neurological: He is alert and oriented to person, place, and time. He has normal sensation and normal strength.   Skin: Skin is warm and dry.   Psychiatric: Mood and affect normal.   Nursing note and vitals reviewed.      LAB RESULTS  Lab Results (last 24 hours)     Procedure Component Value Units Date/Time    CBC & Differential [[id number]] Collected:  05/13/20 0243    Specimen:  Blood Updated:  05/13/20 0255    Narrative:       The following orders were created for panel order CBC & Differential.  Procedure                               Abnormality         Status                     ---------                               -----------         ------                     CBC Auto Differential[[id number]]        Abnormal            Final result                 Please view results for these tests on the individual orders.    Comprehensive Metabolic Panel [[id number]]  (Abnormal) Collected:  05/13/20 0243    Specimen:  Blood Updated:  05/13/20 0316     Glucose 160 mg/dL      BUN 18 mg/dL      Creatinine 0.86 mg/dL      Sodium 138 mmol/L      Potassium 3.6 mmol/L      Chloride 97 mmol/L      CO2 28.2 mmol/L      Calcium 9.2 mg/dL      Total Protein 6.9 g/dL      Albumin 3.60 g/dL      ALT (SGPT) 13 U/L      AST (SGOT) 14 U/L      Alkaline Phosphatase 56 U/L      Total Bilirubin 0.5 mg/dL      eGFR Non African Amer 90 mL/min/1.73      Globulin 3.3 gm/dL      A/G Ratio 1.1 g/dL      BUN/Creatinine Ratio 20.9     Anion Gap  12.8 mmol/L     Narrative:       GFR Normal >60  Chronic Kidney Disease <60  Kidney Failure <15      Troponin [[id number]]  (Normal) Collected:  05/13/20 0243    Specimen:  Blood Updated:  05/13/20 0316     Troponin T <0.010 ng/mL     Narrative:       Troponin T Reference Range:  <= 0.03 ng/mL-   Negative for AMI  >0.03 ng/mL-     Abnormal for myocardial necrosis.  Clinicians would have to utilize clinical acumen, EKG, Troponin and serial changes to determine if it is an Acute Myocardial Infarction or myocardial injury due to an underlying chronic condition.       Results may be falsely decreased if patient taking Biotin.      BNP [[id number]]  (Normal) Collected:  05/13/20 0243    Specimen:  Blood Updated:  05/13/20 0314     proBNP 232.9 pg/mL     Narrative:       Among patients with dyspnea, NT-proBNP is highly sensitive for the detection of acute congestive heart failure. In addition NT-proBNP of <300 pg/ml effectively rules out acute congestive heart failure with 99% negative predictive value.    Results may be falsely decreased if patient taking Biotin.      CBC Auto Differential [[id number]]  (Abnormal) Collected:  05/13/20 0243    Specimen:  Blood Updated:  05/13/20 0255     WBC 8.43 10*3/mm3      RBC 3.39 10*6/mm3      Hemoglobin 12.3 g/dL      Hematocrit 35.7 %      .3 fL      MCH 36.3 pg      MCHC 34.5 g/dL      RDW 15.6 %      RDW-SD 60.1 fl      MPV 11.2 fL      Platelets 105 10*3/mm3     Manual Differential [[id number]]  (Abnormal) Collected:  05/13/20 0243    Specimen:  Blood Updated:  05/13/20 0317     Neutrophil % 81.4 %      Lymphocyte % 10.3 %      Monocyte % 5.2 %      Eosinophil % 2.1 %      Basophil % 1.0 %      Neutrophils Absolute 6.86 10*3/mm3      Lymphocytes Absolute 0.87 10*3/mm3      Monocytes Absolute 0.44 10*3/mm3      Eosinophils Absolute 0.18 10*3/mm3      Basophils Absolute 0.08 10*3/mm3      RBC Morphology Normal     WBC Morphology Normal     Platelet Morphology Normal     Troponin [[id number]]  (Normal) Collected:  05/13/20 0449    Specimen:  Blood Updated:  05/13/20 0521     Troponin T <0.010 ng/mL     Narrative:       Troponin T Reference Range:  <= 0.03 ng/mL-   Negative for AMI  >0.03 ng/mL-     Abnormal for myocardial necrosis.  Clinicians would have to utilize clinical acumen, EKG, Troponin and serial changes to determine if it is an Acute Myocardial Infarction or myocardial injury due to an underlying chronic condition.       Results may be falsely decreased if patient taking Biotin.            I ordered the above labs and reviewed the results    RADIOLOGY  XR Chest 1 View   Final Result           I ordered the above noted radiological studies. Interpreted by radiologist.  Reviewed by me in PACS.       PROCEDURES  Procedures  EKG          EKG time: 0240  Rhythm/Rate: 68, NSR  P waves and MD: nml  QRS, axis: widened, RBBB   ST and T waves: nml     Interpreted Contemporaneously by me, independently viewed  changed compared to prior 10/23/14      PROGRESS AND CONSULTS     The patient was wearing a facemask upon entrance into the room and remained in such throughout their visit.  I was wearing PPE including a facemask as well as gloves at any point entering the room and throughout the visit    0545  Patient states that his chest pain has fully resolved following Toradol administration.  Unremarkable work-up was discussed with the patient including an unremarkable EKG and chest x-ray as well as 2- cardiac enzymes.  The pain is highly atypical for cardiac type pain and is reproducible and relieved with an anti-inflammatory.  So, it is more likely that this pain is inflammatory in nature.  With 2- cardiac enzymes and the patient being pain-free he will be discharged with a stable diagnosis.  The patient voices understanding and all questions answered.      MEDICAL DECISION MAKING  Results were reviewed/discussed with the patient and they were also made aware of online access. Pt also  made aware that some labs, such as cultures, will not be resulted during ER visit and follow up with PMD is necessary.     MDM  Number of Diagnoses or Management Options  Acute chest wall pain:      Amount and/or Complexity of Data Reviewed  Clinical lab tests: ordered and reviewed  Tests in the radiology section of CPT®: ordered and reviewed  Tests in the medicine section of CPT®: ordered and reviewed  Review and summarize past medical records: yes (Patient had a stress test on 7/1/2019 with perfusion studies which were all read as asymptomatic and a low risk study.)  Independent visualization of images, tracings, or specimens: yes (Unremarkable chest x-ray and EKG showing a right bundle branch block)           DIAGNOSIS  Final diagnoses:   Acute chest wall pain       DISPOSITION  DISCHARGE    Patient discharged in stable condition.    Reviewed implications of results, diagnosis, meds, responsibility to follow up, warning signs and symptoms of possible worsening, potential complications and reasons to return to ER    Patient/Family voiced understanding of above instructions.    Discussed plan for discharge, as there is no emergent indication for admission. Patient referred to primary care provider for BP management due to today's BP. Pt/family is agreeable and understands need for follow up and repeat testing.  Pt is aware that discharge does not mean that nothing is wrong but it indicates no emergency is present that requires admission and they must continue care with follow-up as given below or physician of their choice.     FOLLOW-UP  Henry Tapia MD  [address] [address] [phone]    Schedule an appointment as soon as possible for a visit            Medication List      No changes were made to your prescriptions during this visit.           Latest Documented Vital Signs:  As of 05:58  BP- (!) 188/92 HR- 66 Temp- 97.2 °F (36.2 °C) (Tympanic) O2 sat- 98%         Filippo  Bruce Herrera MD  05/13/20 0559

## 2020-05-13 NOTE — ED TRIAGE NOTES
Pt to ED from home per [facility] with reports of left sided chest pain.      Pt received 2 SL nitro and 324mg asa per EMS.  Pain decreased to 6/10 after nitro.    EMS EKG shows R BBB.    All triage performed with this RN wearing appropriate PPE.  Pt arrived to ED in mask per EMS.

## 2020-05-19 NOTE — PROGRESS NOTES
Subjective:        Alexi Leyva is a 65 y.o. male who here for follow up    No chief complaint on file.    He is here today via telephone for a 1month follow-up blood pressure recheck.       HPI     Alexi Leyva is a 65-year-old male, who is known to this provider.  He has a history of hypertension, PVD, DM, chemotherapy, and steroid therapy.  He was seen by me last month and at that time I increased his hydralazine due to increased blood pressure. His previous stress test on 7/2019 indicated a normal myocardial perfusion study with no evidence of ischemia.  There was a small lateral defect that was questionable significance, and early cardiomyopathy.  His echo on 5/2019 revealed EF 66%, LV C is mildly dilated, RV C is mildly dilated, LAC size is mild to moderately dilated, mild TV regurgitation is present, no evidence of pericardial effusion.  Last lipid profile noted on 10/2014 showed good control.          Denies chest pain, shortness of breath, syncope and near syncope.          The following portions of the patient's history were reviewed and updated as appropriate: allergies, current medications, past family history, past medical history, past social history, past surgical history and problem list.    Past Medical History:   Diagnosis Date   • Cancer (CMS/HCC)    • Diabetes mellitus (CMS/HCC)     with weight loss not on any medications   • Hypertension    • PVD (peripheral vascular disease) (CMS/HCC)          reports that he has never smoked. He has never used smokeless tobacco. He reports that he drank about 5.0 standard drinks of alcohol per week. He reports that he does not use drugs.     Family History   Problem Relation Age of Onset   • Hypertension Mother    • Hypertension Father    • Heart failure Father    • Heart disease Father    • Heart attack Father    • Hyperlipidemia Neg Hx        ROS     Review of Systems    Constitutional: Negative for Weight loss, fever, fatigue .   gastrointestinal:  Negative for nausea, or abdominal pain   behavioral/Psych: Negative for insomnia or anxiety  Cardiovascular: Denies chest pain, shortness of breath, syncope and near syncope      Objective:        a telephone visit, unable to do PE    Physical Exam  4/22/19  Procedures       Interpretation Summary     · The left ventricular cavity is mildly dilated.  · Right ventricular cavity is mildly dilated.  · Left atrial cavity size is mild-to-moderately dilated.  · Mild tricuspid valve regurgitation is present.  · Calculated EF = 66%.  · There is no evidence of pericardial effusion.          Interpretation Summary        · Findings consistent with a normal ECG stress test.  · Findings consistent with a normal ECG stress test.  · Left ventricular ejection fraction is borderline normal (Calculated EF = 49%).  · Myocardial perfusion imaging indicates a normal myocardial perfusion study with no evidence of ischemia.  · Impressions are consistent with a low risk study.  · Early Cardiomyopathy  · Small Lateral defect has questionable sig           Current Outpatient Medications:   •  acyclovir (ZOVIRAX) 400 MG tablet, Take 400 mg by mouth Every 4 (Four) Hours While Awake. Take no more than 5 doses a day., Disp: , Rfl:   •  amLODIPine (NORVASC) 10 MG tablet, Take 10 mg by mouth Daily., Disp: , Rfl:   •  calcium carbonate-vitamin d 600-400 MG-UNIT per tablet, Take 1 tablet by mouth., Disp: , Rfl:   •  cloNIDine (CATAPRES) 0.1 MG tablet, clonidine HCl 0.1 mg tablet, Disp: , Rfl:   •  cyclophosphamide 50 MG capsule, Take 600 mg by mouth 1 (One) Time Per Week., Disp: , Rfl:   •  dexamethasone (DECADRON) 4 MG tablet, Take 40 mg by mouth 1 (One) Time Per Week., Disp: , Rfl:   •  hydrALAZINE (APRESOLINE) 50 MG tablet, Take 1 tablet by mouth 3 (Three) Times a Day., Disp: 90 tablet, Rfl: 2  •  labetalol (NORMODYNE) 200 MG tablet, Take 1 tablet by mouth 3 (Three) Times a Day., Disp: 90 tablet, Rfl: 6  •  potassium chloride (K-DUR) 10 MEQ CR  tablet, Take 10 mEq by mouth Daily., Disp: , Rfl:   •  sulfamethoxazole-trimethoprim (BACTRIM DS,SEPTRA DS) 800-160 MG per tablet, , Disp: , Rfl:   •  telmisartan-hydrochlorothiazide (MICARDIS HCT) 80-25 MG per tablet, Take 1 tablet by mouth Daily., Disp: 30 tablet, Rfl: 11  •  VITAMIN D, CHOLECALCIFEROL, PO, Take 1 capsule by mouth Daily., Disp: , Rfl:      Assessment:        Patient Active Problem List   Diagnosis   • Essential hypertension   • Morbid obesity with BMI of 40.0-44.9, adult (CMS/[facility])   • Hyperglycemia               Plan:   1.  Hypertension: Ischemic work-up as above.  His PCP at his last doctor appointment he has had his labetalol increased to 200 mg 3 times a day and then on the very last visit he had his hydralazine increased at that time.  He is here today with better control of his blood pressure.  He states he went to the hospital on 5/13/2020 with some chest wall pain.  He states he has not been having any more pain pain or pressure and at this time does not want to be evaluated with a stress test or echo.  We will reevaluate in 6 months.    Educated patient on exercising for at least 30 minutes a day for 2 to 3 days a week. Importance of controlling hypertension and blood pressure checkup on the regular basis has been explained. Hypertension as a silent killer has been discussed. Risk reduction of the weight and regular exercises to control the hypertension has been explained.      2.  Obesity: BMI 43.4     Significant risk of obesity to CAD, HTN has been explained  Advantages of wt reduction has been explained .     No diagnosis found.    There are no diagnoses linked to this encounter.    COUNSELING: Ryan Ramírez was given to patient for the following topics: diagnostic results, risk factor reductions, impressions, risks and benefits of treatment options and importance of treatment compliance .       SMOKING COUNSELING: Denies     He will follow-up in 6 months, unless he  needs to be seen sooner.    Sincerely,   EFRAIN Kennedy  [facility]  5/20/20  [id number]    This patient has consented to a telehealth visit via telephone. The visit was scheduled as a telephone visit to comply with patient safety concerns in accordance with CDC recommendations.  All vitals recorded within this visit are reported by the patient.  I spent  25 minutes in total including but not limited to the 10 minutes spent in direct conversation with this patient.       EMR Dragon/Transcription disclaimer:   Much of this encounter note is an electronic transcription/translation of spoken language to printed text. The electronic translation of spoken language may permit erroneous, or at times, nonsensical words or phrases to be inadvertently transcribed; Although I have reviewed the note for such errors, some may still exist.

## 2020-05-20 ENCOUNTER — OFFICE VISIT (OUTPATIENT)
Dept: CARDIOLOGY | Facility: CLINIC | Age: 65
End: 2020-05-20

## 2020-05-20 VITALS
DIASTOLIC BLOOD PRESSURE: 66 MMHG | HEART RATE: 58 BPM | HEIGHT: 71 IN | BODY MASS INDEX: 43.82 KG/M2 | WEIGHT: 313 LBS | SYSTOLIC BLOOD PRESSURE: 147 MMHG

## 2020-05-20 DIAGNOSIS — E66.01 MORBID OBESITY WITH BMI OF 40.0-44.9, ADULT (HCC): ICD-10-CM

## 2020-05-20 DIAGNOSIS — I10 ESSENTIAL HYPERTENSION: Primary | ICD-10-CM

## 2020-05-20 PROCEDURE — 99441 PR PHYS/QHP TELEPHONE EVALUATION 5-10 MIN: CPT | Performed by: NURSE PRACTITIONER

## 2020-05-20 RX ORDER — NYSTATIN 100000 [USP'U]/G
POWDER TOPICAL
COMMUNITY
Start: 2020-05-07 | End: 2020-10-28

## 2020-05-20 RX ORDER — CYCLOBENZAPRINE HCL 10 MG
10 TABLET ORAL
COMMUNITY
Start: 2020-05-19

## 2020-05-20 RX ORDER — ONDANSETRON HYDROCHLORIDE 8 MG/1
TABLET, FILM COATED ORAL
COMMUNITY
Start: 2020-04-29

## 2020-07-20 RX ORDER — HYDRALAZINE HYDROCHLORIDE 50 MG/1
TABLET, FILM COATED ORAL
Qty: 90 TABLET | Refills: 1 | Status: SHIPPED | OUTPATIENT
Start: 2020-07-20 | End: 2020-07-27

## 2020-07-27 RX ORDER — HYDRALAZINE HYDROCHLORIDE 50 MG/1
TABLET, FILM COATED ORAL
Qty: 90 TABLET | Refills: 1 | Status: SHIPPED | OUTPATIENT
Start: 2020-07-27 | End: 2020-12-24

## 2020-10-27 RX ORDER — LABETALOL 200 MG/1
TABLET, FILM COATED ORAL
Qty: 90 TABLET | Refills: 5 | Status: SHIPPED | OUTPATIENT
Start: 2020-10-27

## 2020-10-28 RX ORDER — CYCLOPHOSPHAMIDE 50 MG/1
CAPSULE ORAL WEEKLY
COMMUNITY

## 2020-10-28 RX ORDER — DEXAMETHASONE 4 MG/1
10 TABLET ORAL WEEKLY
COMMUNITY

## 2020-10-28 RX ORDER — SULFAMETHOXAZOLE AND TRIMETHOPRIM 800; 160 MG/1; MG/1
1 TABLET ORAL
COMMUNITY

## 2020-10-29 ENCOUNTER — OFFICE VISIT (OUTPATIENT)
Dept: CARDIOLOGY | Facility: CLINIC | Age: 65
End: 2020-10-29

## 2020-10-29 VITALS
HEART RATE: 59 BPM | DIASTOLIC BLOOD PRESSURE: 61 MMHG | WEIGHT: 315 LBS | BODY MASS INDEX: 48.26 KG/M2 | SYSTOLIC BLOOD PRESSURE: 149 MMHG

## 2020-10-29 DIAGNOSIS — E66.01 MORBID OBESITY WITH BMI OF 40.0-44.9, ADULT (HCC): ICD-10-CM

## 2020-10-29 DIAGNOSIS — R73.9 HYPERGLYCEMIA: ICD-10-CM

## 2020-10-29 DIAGNOSIS — I10 ESSENTIAL HYPERTENSION: Primary | ICD-10-CM

## 2020-10-29 PROCEDURE — 99441 PR PHYS/QHP TELEPHONE EVALUATION 5-10 MIN: CPT | Performed by: INTERNAL MEDICINE

## 2020-12-24 RX ORDER — HYDRALAZINE HYDROCHLORIDE 50 MG/1
TABLET, FILM COATED ORAL
Qty: 90 TABLET | Refills: 0 | Status: SHIPPED | OUTPATIENT
Start: 2020-12-24

## (undated) DEVICE — TUBING, SUCTION, 1/4" X 10', STRAIGHT: Brand: MEDLINE

## (undated) DEVICE — CANN NASL CO2 TRULINK W/O2 A/

## (undated) DEVICE — Device: Brand: DEFENDO AIR/WATER/SUCTION AND BIOPSY VALVE

## (undated) DEVICE — THE TORRENT IRRIGATION SCOPE CONNECTOR IS USED WITH THE TORRENT IRRIGATION TUBING TO PROVIDE IRRIGATION FLUIDS SUCH AS STERILE WATER DURING GASTROINTESTINAL ENDOSCOPIC PROCEDURES WHEN USED IN CONJUNCTION WITH AN IRRIGATION PUMP (OR ELECTROSURGICAL UNIT).: Brand: TORRENT